# Patient Record
Sex: FEMALE | Race: WHITE | NOT HISPANIC OR LATINO | Employment: OTHER | URBAN - NONMETROPOLITAN AREA
[De-identification: names, ages, dates, MRNs, and addresses within clinical notes are randomized per-mention and may not be internally consistent; named-entity substitution may affect disease eponyms.]

---

## 2017-01-13 DIAGNOSIS — E11.9 DIABETES MELLITUS TYPE 2, CONTROLLED, WITHOUT COMPLICATIONS (HCC): ICD-10-CM

## 2017-01-13 DIAGNOSIS — I10 ESSENTIAL HYPERTENSION: ICD-10-CM

## 2017-01-13 RX ORDER — LISINOPRIL 10 MG/1
TABLET ORAL
Qty: 90 TABLET | Refills: 0 | Status: SHIPPED | OUTPATIENT
Start: 2017-01-13 | End: 2017-04-19 | Stop reason: SDUPTHER

## 2017-04-19 DIAGNOSIS — I10 ESSENTIAL HYPERTENSION: ICD-10-CM

## 2017-04-19 DIAGNOSIS — E11.9 CONTROLLED TYPE 2 DIABETES MELLITUS WITHOUT COMPLICATION, WITHOUT LONG-TERM CURRENT USE OF INSULIN (HCC): ICD-10-CM

## 2017-04-19 RX ORDER — LISINOPRIL 10 MG/1
10 TABLET ORAL DAILY
Qty: 90 TABLET | Refills: 1 | Status: SHIPPED | OUTPATIENT
Start: 2017-04-19 | End: 2017-08-14 | Stop reason: SDUPTHER

## 2017-04-19 NOTE — TELEPHONE ENCOUNTER
Elba called and needs refills on her Lisiinopril and Metformin. She has a physical coming up but will run out before that. Her appt was moved out due to Dr Sanchez being on vacation. I will refill these for her.

## 2017-05-12 ENCOUNTER — OFFICE VISIT (OUTPATIENT)
Dept: FAMILY MEDICINE CLINIC | Facility: CLINIC | Age: 62
End: 2017-05-12

## 2017-05-12 VITALS
HEIGHT: 66 IN | TEMPERATURE: 98.1 F | SYSTOLIC BLOOD PRESSURE: 110 MMHG | WEIGHT: 175 LBS | OXYGEN SATURATION: 98 % | HEART RATE: 88 BPM | DIASTOLIC BLOOD PRESSURE: 70 MMHG | BODY MASS INDEX: 28.12 KG/M2

## 2017-05-12 DIAGNOSIS — J40 BRONCHITIS: Primary | ICD-10-CM

## 2017-05-12 PROCEDURE — 99213 OFFICE O/P EST LOW 20 MIN: CPT | Performed by: FAMILY MEDICINE

## 2017-05-12 RX ORDER — PROMETHAZINE HYDROCHLORIDE AND CODEINE PHOSPHATE 6.25; 1 MG/5ML; MG/5ML
5 SYRUP ORAL EVERY 4 HOURS PRN
Qty: 180 ML | Refills: 1
Start: 2017-05-12 | End: 2017-08-23

## 2017-05-12 RX ORDER — AZITHROMYCIN 250 MG/1
TABLET, FILM COATED ORAL
Qty: 6 TABLET | Refills: 0 | Status: SHIPPED | OUTPATIENT
Start: 2017-05-12 | End: 2017-08-23

## 2017-05-24 ENCOUNTER — TELEPHONE (OUTPATIENT)
Dept: FAMILY MEDICINE CLINIC | Facility: CLINIC | Age: 62
End: 2017-05-24

## 2017-08-14 ENCOUNTER — TELEPHONE (OUTPATIENT)
Dept: FAMILY MEDICINE CLINIC | Facility: CLINIC | Age: 62
End: 2017-08-14

## 2017-08-14 DIAGNOSIS — E11.9 CONTROLLED TYPE 2 DIABETES MELLITUS WITHOUT COMPLICATION, WITHOUT LONG-TERM CURRENT USE OF INSULIN (HCC): ICD-10-CM

## 2017-08-14 DIAGNOSIS — E78.2 MIXED HYPERLIPIDEMIA: ICD-10-CM

## 2017-08-14 DIAGNOSIS — I10 ESSENTIAL HYPERTENSION: ICD-10-CM

## 2017-08-14 RX ORDER — ATORVASTATIN CALCIUM 20 MG/1
20 TABLET, FILM COATED ORAL DAILY
Qty: 90 TABLET | Refills: 0 | Status: SHIPPED | OUTPATIENT
Start: 2017-08-14 | End: 2021-02-05 | Stop reason: SDUPTHER

## 2017-08-14 RX ORDER — LORATADINE 10 MG/1
10 TABLET ORAL DAILY
Qty: 90 TABLET | Refills: 0 | Status: SHIPPED | OUTPATIENT
Start: 2017-08-14

## 2017-08-14 RX ORDER — RANITIDINE 150 MG/1
150 TABLET ORAL 2 TIMES DAILY
Qty: 180 TABLET | Refills: 0 | Status: SHIPPED | OUTPATIENT
Start: 2017-08-14 | End: 2021-02-05

## 2017-08-14 RX ORDER — LISINOPRIL 10 MG/1
10 TABLET ORAL DAILY
Qty: 90 TABLET | Refills: 0 | Status: SHIPPED | OUTPATIENT
Start: 2017-08-14 | End: 2017-09-22

## 2017-08-14 RX ORDER — ZOLPIDEM TARTRATE 10 MG/1
10 TABLET ORAL NIGHTLY PRN
Qty: 30 TABLET | Refills: 0 | OUTPATIENT
Start: 2017-08-14

## 2017-08-14 NOTE — TELEPHONE ENCOUNTER
Elba needs all her meds refilled. She will run out before her physical appt. I will send these in for her.

## 2017-08-23 ENCOUNTER — OFFICE VISIT (OUTPATIENT)
Dept: FAMILY MEDICINE CLINIC | Facility: CLINIC | Age: 62
End: 2017-08-23

## 2017-08-23 VITALS
WEIGHT: 175 LBS | HEART RATE: 87 BPM | DIASTOLIC BLOOD PRESSURE: 80 MMHG | SYSTOLIC BLOOD PRESSURE: 128 MMHG | OXYGEN SATURATION: 98 % | BODY MASS INDEX: 28.12 KG/M2 | HEIGHT: 66 IN

## 2017-08-23 DIAGNOSIS — N76.0 ACUTE VAGINITIS: ICD-10-CM

## 2017-08-23 DIAGNOSIS — R07.2 PRECORDIAL PAIN: Primary | ICD-10-CM

## 2017-08-23 DIAGNOSIS — I10 ESSENTIAL HYPERTENSION: ICD-10-CM

## 2017-08-23 DIAGNOSIS — E11.9 CONTROLLED TYPE 2 DIABETES MELLITUS WITHOUT COMPLICATION, UNSPECIFIED LONG TERM INSULIN USE STATUS: ICD-10-CM

## 2017-08-23 DIAGNOSIS — E78.2 MIXED HYPERLIPIDEMIA: ICD-10-CM

## 2017-08-23 PROCEDURE — 99214 OFFICE O/P EST MOD 30 MIN: CPT | Performed by: FAMILY MEDICINE

## 2017-08-23 PROCEDURE — 93000 ELECTROCARDIOGRAM COMPLETE: CPT | Performed by: FAMILY MEDICINE

## 2017-08-23 PROCEDURE — 36415 COLL VENOUS BLD VENIPUNCTURE: CPT | Performed by: FAMILY MEDICINE

## 2017-08-23 RX ORDER — FLUCONAZOLE 150 MG/1
TABLET ORAL
Qty: 2 TABLET | Refills: 1 | Status: SHIPPED | OUTPATIENT
Start: 2017-08-23 | End: 2017-08-25

## 2017-08-23 RX ORDER — NITROGLYCERIN 0.4 MG/1
0.4 TABLET SUBLINGUAL
Qty: 25 TABLET | Refills: 1 | Status: SHIPPED | OUTPATIENT
Start: 2017-08-23 | End: 2021-02-05 | Stop reason: SDUPTHER

## 2017-08-23 NOTE — PROGRESS NOTES
Subjective   Elba Park is a 61 y.o. female.     History of Present Illness   Elba is a 61-year-old female whose chief complaint today is chest pain.  She points to the left anterior aspect of the chest.  Her last physical was one year ago 1 mentions that she was having some then but did not want to pursue a workup.  Her  has recently been extensively treated for ischemic heart disease.  Today, she states that she's had significant pain over the last week or so.  Does not seem to radiate into her neck.  Not associated with diaphoresis but states she is very very weak.    Other problems include hypertension, hyperlipidemia, obstructive sleep apnea, and diabetes type 2.  Regarding diabetes, her last A1c a year ago was 8.5.  Basically she has been very medically noncompliant.  Does not come back for visits.  The following portions of the patient's history were reviewed and updated as appropriate: allergies, current medications, past family history, past medical history, past social history, past surgical history and problem list.    Review of Systems   Constitutional: Positive for activity change and fatigue. Negative for fever.   HENT: Negative for congestion and sore throat.    Respiratory: Negative for cough, shortness of breath and wheezing.    Cardiovascular: Positive for chest pain. Negative for palpitations and leg swelling.   Gastrointestinal: Negative for abdominal pain, blood in stool, diarrhea, nausea and vomiting.   Genitourinary: Negative for dysuria.   Skin: Negative for rash.       Objective   Physical Exam   Constitutional: She is oriented to person, place, and time. She appears well-nourished.   Obese, anxious.   HENT:   Right Ear: External ear normal.   Left Ear: External ear normal.   Nose: Nose normal.   Eyes: Conjunctivae and EOM are normal.   Neck: Normal range of motion. Neck supple. No thyromegaly present.   Cardiovascular: Normal rate, regular rhythm and normal heart sounds.     No murmur heard.  Pulmonary/Chest: Effort normal and breath sounds normal.   Abdominal: Soft. Bowel sounds are normal. She exhibits no mass. There is no tenderness.   Musculoskeletal: Normal range of motion. She exhibits no edema.   Lymphadenopathy:     She has no cervical adenopathy.     She has no axillary adenopathy.        Right: No inguinal and no supraclavicular adenopathy present.        Left: No inguinal and no supraclavicular adenopathy present.   Neurological: She is alert and oriented to person, place, and time. Coordination normal.   Skin: Skin is warm and dry. No rash noted. No pallor.   Psychiatric: She has a normal mood and affect. Her behavior is normal. Thought content normal.   Vitals reviewed.      ECG 12 Lead  Date/Time: 8/23/2017 11:35 AM  Performed by: BRENDAN MULLIGAN  Authorized by: BRENDAN MULLIGAN   Comparison: not compared with previous ECG   Rhythm: sinus rhythm  Rate: normal  ST Segments: ST segments normal  T Waves: T waves normal  QRS axis: left  Other: no other findings  Clinical impression: normal ECG          Assessment/Plan   Elba was seen today for chest pain and med refill.    Diagnoses and all orders for this visit:    Precordial pain  -     ECG 12 Lead  -     nitroglycerin (NITROSTAT) 0.4 MG SL tablet; Place 1 tablet under the tongue Every 5 (Five) Minutes As Needed for Chest Pain. Take no more than 3 doses in 15 minutes.    Essential hypertension  -     CBC & Differential  -     Comprehensive Metabolic Panel  -     Lipid Panel  -     TSH    Mixed hyperlipidemia    Controlled type 2 diabetes mellitus without complication, unspecified long term insulin use status  -     Hemoglobin A1c    Acute vaginitis  -     fluconazole (DIFLUCAN) 150 MG tablet; 1 po for vaginitis, may repeat in 4 days      Elba is a 61-year-old female with hypertension, hyperlipidemia, type 2 diabetes.  She has been very noncompliant with follow-ups regarding the diabetes.  Her  has just  gone through open heart surgery in May.  Today she is complaining of anterior chest pain.  She describes it as a heaviness.  Does not really radiate to the neck or arm and is not particularly associated with diaphoresis.  She is very frightened that it may be ischemic heart disease.  Her EKG is very normal and I have given her a copy to take to the cardiologist.  She will see Dr. Bernstein this Friday.  I do think she will probably need some sort of stress test but I will leave this to the discretion of the cardiologist.  Labs have been drawn including an A1c.  I also gave her a prescription for nitroglycerin.  For now, I want her to take an aspirin daily.  Tentative follow-up in 3 months regarding her diabetes.  I will speak to her when the labs are back.    Addendum.  Possible yeast vaginitis.  Also concerns me that her diabetes may be way out-of-control.  Diflucan given.  Did not do an exam today.

## 2017-08-24 LAB
ALBUMIN SERPL-MCNC: 4.1 G/DL (ref 3.6–4.8)
ALBUMIN/GLOB SERPL: 1.4 {RATIO} (ref 1.2–2.2)
ALP SERPL-CCNC: 126 IU/L (ref 39–117)
ALT SERPL-CCNC: 22 IU/L (ref 0–32)
AST SERPL-CCNC: 17 IU/L (ref 0–40)
BASOPHILS # BLD AUTO: 0 X10E3/UL (ref 0–0.2)
BASOPHILS NFR BLD AUTO: 0 %
BILIRUB SERPL-MCNC: 0.3 MG/DL (ref 0–1.2)
BUN SERPL-MCNC: 12 MG/DL (ref 8–27)
BUN/CREAT SERPL: 13 (ref 12–28)
CALCIUM SERPL-MCNC: 9.6 MG/DL (ref 8.7–10.3)
CHLORIDE SERPL-SCNC: 103 MMOL/L (ref 96–106)
CHOLEST SERPL-MCNC: 154 MG/DL (ref 100–199)
CO2 SERPL-SCNC: 32 MMOL/L (ref 18–29)
CREAT SERPL-MCNC: 0.96 MG/DL (ref 0.57–1)
EOSINOPHIL # BLD AUTO: 0.1 X10E3/UL (ref 0–0.4)
EOSINOPHIL NFR BLD AUTO: 1 %
ERYTHROCYTE [DISTWIDTH] IN BLOOD BY AUTOMATED COUNT: 14.2 % (ref 12.3–15.4)
GLOBULIN SER CALC-MCNC: 2.9 G/DL (ref 1.5–4.5)
GLUCOSE SERPL-MCNC: 178 MG/DL (ref 65–99)
HBA1C MFR BLD: 8 % (ref 4.8–5.6)
HCT VFR BLD AUTO: 42.9 % (ref 34–46.6)
HDLC SERPL-MCNC: 41 MG/DL
HGB BLD-MCNC: 14.9 G/DL (ref 11.1–15.9)
LDLC SERPL CALC-MCNC: 76 MG/DL (ref 0–99)
LYMPHOCYTES # BLD AUTO: 2.2 X10E3/UL (ref 0.7–3.1)
LYMPHOCYTES NFR BLD AUTO: 22 %
MCH RBC QN AUTO: 29.6 PG (ref 26.6–33)
MCHC RBC AUTO-ENTMCNC: 34.7 G/DL (ref 31.5–35.7)
MCV RBC AUTO: 85 FL (ref 79–97)
MONOCYTES # BLD AUTO: 0.8 X10E3/UL (ref 0.1–0.9)
MONOCYTES NFR BLD AUTO: 8 %
NEUTROPHILS # BLD AUTO: 6.9 X10E3/UL (ref 1.4–7)
NEUTROPHILS NFR BLD AUTO: 69 %
PLATELET # BLD AUTO: 316 X10E3/UL (ref 150–379)
POTASSIUM SERPL-SCNC: 4.8 MMOL/L (ref 3.5–5.2)
PROT SERPL-MCNC: 7 G/DL (ref 6–8.5)
RBC # BLD AUTO: 5.04 X10E6/UL (ref 3.77–5.28)
SODIUM SERPL-SCNC: 137 MMOL/L (ref 134–144)
TRIGL SERPL-MCNC: 184 MG/DL (ref 0–149)
TSH SERPL DL<=0.005 MIU/L-ACNC: 1.15 UIU/ML (ref 0.45–4.5)
VLDLC SERPL CALC-MCNC: 37 MG/DL (ref 5–40)
WBC # BLD AUTO: 10 X10E3/UL (ref 3.4–10.8)

## 2017-08-24 NOTE — PROGRESS NOTES
Call.  Labs are reviewed.  No anemia.  Renal and liver function are fine.  Thyroid function normal.  Overall, cholesterol profile also looks good and she should definitely continue the statin.  Hemoglobin A1c, 3 month average blood sugar test, is 8.0.  Overall this is not too bad.  Lower is better.  I am not going to change medicines yet.  With the cardiologist to see you first.  Of course diet/ exercise is the cornerstone of diabetes care.  In 3 months we will repeat A1c and will make other adjustments if necessary.

## 2017-08-25 ENCOUNTER — CONSULT (OUTPATIENT)
Dept: CARDIOLOGY | Facility: CLINIC | Age: 62
End: 2017-08-25

## 2017-08-25 ENCOUNTER — TELEPHONE (OUTPATIENT)
Dept: FAMILY MEDICINE CLINIC | Facility: CLINIC | Age: 62
End: 2017-08-25

## 2017-08-25 VITALS
HEART RATE: 77 BPM | BODY MASS INDEX: 27.37 KG/M2 | DIASTOLIC BLOOD PRESSURE: 86 MMHG | HEIGHT: 67 IN | WEIGHT: 174.4 LBS | SYSTOLIC BLOOD PRESSURE: 124 MMHG

## 2017-08-25 DIAGNOSIS — E78.5 HYPERLIPIDEMIA LDL GOAL <70: ICD-10-CM

## 2017-08-25 DIAGNOSIS — I10 ESSENTIAL HYPERTENSION: ICD-10-CM

## 2017-08-25 DIAGNOSIS — E11.9 CONTROLLED TYPE 2 DIABETES MELLITUS WITHOUT COMPLICATION, WITHOUT LONG-TERM CURRENT USE OF INSULIN (HCC): ICD-10-CM

## 2017-08-25 DIAGNOSIS — R06.09 DYSPNEA ON EXERTION: ICD-10-CM

## 2017-08-25 DIAGNOSIS — R55 VASOVAGAL EPISODE: ICD-10-CM

## 2017-08-25 DIAGNOSIS — I20.0 UNSTABLE ANGINA (HCC): Primary | ICD-10-CM

## 2017-08-25 PROCEDURE — 99244 OFF/OP CNSLTJ NEW/EST MOD 40: CPT | Performed by: INTERNAL MEDICINE

## 2017-08-25 RX ORDER — ISOSORBIDE MONONITRATE 30 MG/1
30 TABLET, EXTENDED RELEASE ORAL DAILY
Qty: 90 TABLET | Refills: 3 | Status: SHIPPED | OUTPATIENT
Start: 2017-08-25 | End: 2017-09-22

## 2017-08-25 NOTE — TELEPHONE ENCOUNTER
----- Message from Sacha Sanchez MD sent at 8/24/2017  7:43 AM EDT -----  Call.  Labs are reviewed.  No anemia.  Renal and liver function are fine.  Thyroid function normal.  Overall, cholesterol profile also looks good and she should definitely continue the statin.  Hemoglobin A1c, 3 month average blood sugar test, is 8.0.  Overall this is not too bad.  Lower is better.  I am not going to change medicines yet.  With the cardiologist to see you first.  Of course diet/ exercise is the cornerstone of diabetes care.  In 3 months we will repeat A1c and will make other adjustments if necessary.

## 2017-08-25 NOTE — ASSESSMENT & PLAN NOTE
· Progressive chest pain symptoms with both typical and atypical features  · Obtain nuclear stress test and echo  · Start isosorbide mononitrate 30 mg daily

## 2017-08-25 NOTE — PROGRESS NOTES
Encounter Date:08/25/2017    Patient ID: Elba Park is a  61 y.o. female who resides in [place], [place].    CC/Reason for visit:  Chest Pain (Consult ); Shortness of Breath; and Fatigue          Elba Park is referred to my office by Sacha Sanchez MD for evaluation of chest discomfort.  The patient has experienced chest discomfort over the last year.  These episodes are associated with pressure in the left precordium, generalized weakness, and radiation down her left arm and in her right neck.  The symptoms first started in March but she did not pursue evaluation at that time as her , who is a patient of mine, was undergoing bypass surgery.  She states that these symptoms have become more frequent.  Her symptoms of pressure can be constant and are relieved with nitroglycerin.  She states that she is had to curtail her physical activities and presently cannot walk through [place] without having to stop with symptoms.  She denies TIA or stroke symptoms.  She's not had a previous ischemic evaluation.  The patient also reports over the years having episodes of near syncope.  She will feel hot and flushed prior to losing postural tone.  She will not completely lose consciousness.  After several minutes, she will improve but still not feel well for some period time afterward.      Review of Systems   Constitution: Positive for weakness, malaise/fatigue and night sweats.   Eyes: Negative for vision loss in left eye and vision loss in right eye.   Cardiovascular: Positive for chest pain, dyspnea on exertion and palpitations. Negative for near-syncope, orthopnea, paroxysmal nocturnal dyspnea and syncope.   Respiratory: Positive for shortness of breath, sleep disturbances due to breathing and snoring.    Musculoskeletal: Positive for muscle weakness, neck pain and stiffness. Negative for myalgias.   Gastrointestinal: Positive for heartburn.   Neurological: Positive for difficulty with  "concentration and numbness. Negative for brief paralysis, excessive daytime sleepiness, focal weakness and paresthesias.   Psychiatric/Behavioral: The patient is nervous/anxious.    Allergic/Immunologic: Positive for environmental allergies.   All other systems reviewed and are negative.      The patient's past medical, social, family history and ROS reviewed in the patient's electronic medical record.    Allergies  Adhesive tape    Outpatient Prescriptions Marked as Taking for the 8/25/17 encounter (Consult) with Son Bernstein MD   Medication Sig Dispense Refill   • atorvastatin (LIPITOR) 20 MG tablet Take 1 tablet by mouth Daily. 90 tablet 0   • lisinopril (PRINIVIL,ZESTRIL) 10 MG tablet Take 1 tablet by mouth Daily. 90 tablet 0   • loratadine (CLARITIN) 10 MG tablet Take 1 tablet by mouth Daily. (Patient taking differently: Take 10 mg by mouth Daily As Needed.) 90 tablet 0   • metFORMIN (GLUCOPHAGE) 500 MG tablet Take 1 tablet by mouth 2 (Two) Times a Day With Meals. 180 tablet 0   • nitroglycerin (NITROSTAT) 0.4 MG SL tablet Place 1 tablet under the tongue Every 5 (Five) Minutes As Needed for Chest Pain. Take no more than 3 doses in 15 minutes. 25 tablet 1   • raNITIdine (ZANTAC) 150 MG tablet Take 1 tablet by mouth 2 (Two) Times a Day. (Patient taking differently: Take 150 mg by mouth 2 (Two) Times a Day As Needed.) 180 tablet 0   • zolpidem (AMBIEN) 10 MG tablet Take 1 tablet by mouth At Night As Needed for Sleep. 30 tablet 0   • [DISCONTINUED] fluconazole (DIFLUCAN) 150 MG tablet 1 po for vaginitis, may repeat in 4 days 2 tablet 1         Blood pressure 124/86, pulse 77, height 67\" (170.2 cm), weight 174 lb 6.4 oz (79.1 kg).  Body mass index is 27.31 kg/(m^2).    Physical Exam   Constitutional: She is oriented to person, place, and time. She appears well-developed and well-nourished.   HENT:   Head: Normocephalic and atraumatic.   Eyes: Pupils are equal, round, and reactive to light. No scleral " icterus.   Neck: No JVD present. No thyromegaly present.   Cardiovascular: Normal rate and regular rhythm.  Exam reveals no gallop.    No murmur heard.  Pulmonary/Chest: Effort normal and breath sounds normal.   Abdominal: Soft. She exhibits no distension. There is no hepatosplenomegaly.   Musculoskeletal: She exhibits no edema.   Neurological: She is alert and oriented to person, place, and time.   Skin: Skin is warm and dry.   Psychiatric: She has a normal mood and affect. Her behavior is normal.       Data Review:     Lab Results   Component Value Date    HGBA1C 8.0 (H) 08/23/2017     Lab Results   Component Value Date    TSH 1.150 08/23/2017     Lab Results   Component Value Date    TRIG 184 (H) 08/23/2017    HDL 41 08/23/2017    AST 17 08/23/2017    ALT 22 08/23/2017     Lab Results   Component Value Date    BUN 12 08/23/2017    CREATININE 0.96 08/23/2017    EGFRIFNONA 64 08/23/2017    EGFRIFAFRI 74 08/23/2017    BCR 13 08/23/2017    K 4.8 08/23/2017    CO2 32 (H) 08/23/2017    CALCIUM 9.6 08/23/2017    PROTENTOTREF 7.0 08/23/2017    ALBUMIN 4.1 08/23/2017    LABIL2 1.4 08/23/2017    AST 17 08/23/2017    ALT 22 08/23/2017     Lab Results   Component Value Date    WBC 10.0 08/23/2017    HGB 14.9 08/23/2017    HCT 42.9 08/23/2017    MCV 85 08/23/2017     08/23/2017     Procedures    EKG (8/23/17): Sinus rhythm with no ischemic ST or T-wave changes.       Problem List Items Addressed This Visit        Cardiovascular and Mediastinum    Unstable angina - Primary    Overview     · EKG with no ischemic changes, 8/23/17         Current Assessment & Plan     · Progressive chest pain symptoms with both typical and atypical features  · Obtain nuclear stress test and echo  · Start isosorbide mononitrate 30 mg daily         Relevant Medications    isosorbide mononitrate (IMDUR) 30 MG 24 hr tablet    Other Relevant Orders    Stress Test With Myocardial Perfusion One Day    Adult Transthoracic Echo Complete With  Contrast    Essential hypertension    Current Assessment & Plan     · Controlled  · Continue lisinopril         Hyperlipidemia LDL goal <70    Overview     · Statin therapy indicated given the presence of diabetes         Current Assessment & Plan     · Continue atorvastatin         Vasovagal episode    Overview     · History of recurrent near syncopal spells with prodrome         Current Assessment & Plan     · Obtain echocardiogram to ensure structurally normal heart            Respiratory    Dyspnea on exertion    Relevant Orders    Adult Transthoracic Echo Complete With Contrast       Endocrine    Diabetes mellitus type 2, controlled, without complications               · Echocardiogram  · Exercise nuclear stress test  · Start isosorbide mononitrate 30 mg daily  · Further recommendations to follow    Son Bernstein MD  8/25/2017     Scribed for Son Bernstein MD by Jamei Patton. 8/25/2017  6:15 PM

## 2017-08-28 ENCOUNTER — TELEPHONE (OUTPATIENT)
Dept: FAMILY MEDICINE CLINIC | Facility: CLINIC | Age: 62
End: 2017-08-28

## 2017-08-31 ENCOUNTER — TELEPHONE (OUTPATIENT)
Dept: FAMILY MEDICINE CLINIC | Facility: CLINIC | Age: 62
End: 2017-08-31

## 2017-09-01 ENCOUNTER — HOSPITAL ENCOUNTER (OUTPATIENT)
Dept: CARDIOLOGY | Facility: HOSPITAL | Age: 62
Discharge: HOME | End: 2017-09-01
Attending: INTERNAL MEDICINE | Admitting: INTERNAL MEDICINE

## 2017-09-01 ENCOUNTER — HOSPITAL ENCOUNTER (OUTPATIENT)
Dept: CARDIOLOGY | Facility: HOSPITAL | Age: 62
Discharge: HOME | End: 2017-09-01
Attending: INTERNAL MEDICINE

## 2017-09-01 DIAGNOSIS — I20.0 UNSTABLE ANGINA (HCC): ICD-10-CM

## 2017-09-01 DIAGNOSIS — R06.09 DYSPNEA ON EXERTION: ICD-10-CM

## 2017-09-01 LAB
BH CV ECHO MEAS - AO ROOT AREA (BSA CORRECTED): 1.7
BH CV ECHO MEAS - AO ROOT AREA: 8.6 CM^2
BH CV ECHO MEAS - AO ROOT DIAM: 3.3 CM
BH CV ECHO MEAS - BSA(HAYCOCK): 2 M^2
BH CV ECHO MEAS - BSA: 1.9 M^2
BH CV ECHO MEAS - BZI_BMI: 27.4 KILOGRAMS/M^2
BH CV ECHO MEAS - BZI_METRIC_HEIGHT: 170.2 CM
BH CV ECHO MEAS - BZI_METRIC_WEIGHT: 79.4 KG
BH CV ECHO MEAS - CONTRAST EF (2CH): 59.4 ML/M^2
BH CV ECHO MEAS - CONTRAST EF 4CH: 53.7 ML/M^2
BH CV ECHO MEAS - EDV(CUBED): 131.1 ML
BH CV ECHO MEAS - EDV(MOD-SP2): 64 ML
BH CV ECHO MEAS - EDV(MOD-SP4): 82 ML
BH CV ECHO MEAS - EDV(TEICH): 122.7 ML
BH CV ECHO MEAS - EF(CUBED): 64.4 %
BH CV ECHO MEAS - EF(MOD-SP2): 59.4 %
BH CV ECHO MEAS - EF(MOD-SP4): 53.7 %
BH CV ECHO MEAS - EF(TEICH): 55.6 %
BH CV ECHO MEAS - ESV(CUBED): 46.7 ML
BH CV ECHO MEAS - ESV(MOD-SP2): 26 ML
BH CV ECHO MEAS - ESV(MOD-SP4): 38 ML
BH CV ECHO MEAS - ESV(TEICH): 54.4 ML
BH CV ECHO MEAS - FS: 29.1 %
BH CV ECHO MEAS - IVS/LVPW: 1.1
BH CV ECHO MEAS - IVSD: 0.86 CM
BH CV ECHO MEAS - LA DIMENSION: 3.4 CM
BH CV ECHO MEAS - LA/AO: 1
BH CV ECHO MEAS - LV DIASTOLIC VOL/BSA (35-75): 42.9 ML/M^2
BH CV ECHO MEAS - LV MASS(C)D: 145.6 GRAMS
BH CV ECHO MEAS - LV MASS(C)DI: 76.2 GRAMS/M^2
BH CV ECHO MEAS - LV MAX PG: 3.4 MMHG
BH CV ECHO MEAS - LV MEAN PG: 2 MMHG
BH CV ECHO MEAS - LV SYSTOLIC VOL/BSA (12-30): 19.9 ML/M^2
BH CV ECHO MEAS - LV V1 MAX: 91.7 CM/SEC
BH CV ECHO MEAS - LV V1 MEAN: 58.1 CM/SEC
BH CV ECHO MEAS - LV V1 VTI: 21.3 CM
BH CV ECHO MEAS - LVIDD: 5.1 CM
BH CV ECHO MEAS - LVIDS: 3.6 CM
BH CV ECHO MEAS - LVLD AP2: 6.6 CM
BH CV ECHO MEAS - LVLD AP4: 7 CM
BH CV ECHO MEAS - LVLS AP2: 5.3 CM
BH CV ECHO MEAS - LVLS AP4: 5.8 CM
BH CV ECHO MEAS - LVOT AREA (M): 2.8 CM^2
BH CV ECHO MEAS - LVOT AREA: 2.8 CM^2
BH CV ECHO MEAS - LVOT DIAM: 1.9 CM
BH CV ECHO MEAS - LVPWD: 0.8 CM
BH CV ECHO MEAS - MV A MAX VEL: 86.4 CM/SEC
BH CV ECHO MEAS - MV E MAX VEL: 65.6 CM/SEC
BH CV ECHO MEAS - MV E/A: 0.76
BH CV ECHO MEAS - PA ACC SLOPE: 711 CM/SEC^2
BH CV ECHO MEAS - PA ACC TIME: 0.12 SEC
BH CV ECHO MEAS - PA PR(ACCEL): 24.6 MMHG
BH CV ECHO MEAS - PI END-D VEL: 106 CM/SEC
BH CV ECHO MEAS - RAP SYSTOLE: 8 MMHG
BH CV ECHO MEAS - RVDD: 3.3 CM
BH CV ECHO MEAS - RVSP: 26.1 MMHG
BH CV ECHO MEAS - SI(CUBED): 44.2 ML/M^2
BH CV ECHO MEAS - SI(LVOT): 31.6 ML/M^2
BH CV ECHO MEAS - SI(MOD-SP2): 19.9 ML/M^2
BH CV ECHO MEAS - SI(MOD-SP4): 23 ML/M^2
BH CV ECHO MEAS - SI(TEICH): 35.7 ML/M^2
BH CV ECHO MEAS - SV(CUBED): 84.4 ML
BH CV ECHO MEAS - SV(LVOT): 60.4 ML
BH CV ECHO MEAS - SV(MOD-SP2): 38 ML
BH CV ECHO MEAS - SV(MOD-SP4): 44 ML
BH CV ECHO MEAS - SV(TEICH): 68.3 ML
BH CV ECHO MEAS - TAPSE (>1.6): 1.7 CM2
BH CV ECHO MEAS - TR MAX VEL: 213 CM/SEC
BH CV NUCLEAR PRIOR STUDY: 2
BH CV STRESS BP STAGE 1: NORMAL
BH CV STRESS BP STAGE 2: NORMAL
BH CV STRESS DURATION MIN STAGE 1: 3
BH CV STRESS DURATION MIN STAGE 2: 2
BH CV STRESS DURATION SEC STAGE 1: 0
BH CV STRESS DURATION SEC STAGE 2: 0
BH CV STRESS GRADE STAGE 1: 10
BH CV STRESS GRADE STAGE 2: 12
BH CV STRESS HR STAGE 1: 130
BH CV STRESS HR STAGE 2: 147
BH CV STRESS METS STAGE 1: 5
BH CV STRESS METS STAGE 2: 7.5
BH CV STRESS O2 STAGE 2: 98
BH CV STRESS PROTOCOL 1: NORMAL
BH CV STRESS RECOVERY BP: NORMAL MMHG
BH CV STRESS RECOVERY HR: 84 BPM
BH CV STRESS RECOVERY O2: 98 %
BH CV STRESS SPEED STAGE 1: 1.7
BH CV STRESS SPEED STAGE 2: 2.5
BH CV STRESS STAGE 1: 1
BH CV STRESS STAGE 2: 2
BH CV VAS BP LEFT ARM: NORMAL MMHG
BH CV XLRA - RV BASE: 3.7 CM
BH CV XLRA - RV LENGTH: 5.4 CM
BH CV XLRA - RV MID: 2.9 CM
LEFT ATRIUM VOLUME INDEX: 24 ML/M2
LV EF 2D ECHO EST: 60 %
LV EF NUC BP: 49 %
MAXIMAL PREDICTED HEART RATE: 159 BPM
PERCENT MAX PREDICTED HR: 92.45 %
STRESS BASELINE BP: NORMAL MMHG
STRESS BASELINE HR: 82 BPM
STRESS O2 SAT REST: 97 %
STRESS PERCENT HR: 109 %
STRESS POST ESTIMATED WORKLOAD: 6.4 METS
STRESS POST EXERCISE DUR MIN: 5 MIN
STRESS POST EXERCISE DUR SEC: 0 SEC
STRESS POST O2 SAT PEAK: 98 %
STRESS POST PEAK BP: NORMAL MMHG
STRESS POST PEAK HR: 147 BPM
STRESS TARGET HR: 135 BPM

## 2017-09-01 PROCEDURE — 93306 TTE W/DOPPLER COMPLETE: CPT

## 2017-09-01 PROCEDURE — 78452 HT MUSCLE IMAGE SPECT MULT: CPT

## 2017-09-01 PROCEDURE — 93306 TTE W/DOPPLER COMPLETE: CPT | Performed by: INTERNAL MEDICINE

## 2017-09-01 PROCEDURE — 93018 CV STRESS TEST I&R ONLY: CPT | Performed by: INTERNAL MEDICINE

## 2017-09-01 PROCEDURE — 0 TECHNETIUM SESTAMIBI: Performed by: INTERNAL MEDICINE

## 2017-09-01 PROCEDURE — 78452 HT MUSCLE IMAGE SPECT MULT: CPT | Performed by: INTERNAL MEDICINE

## 2017-09-01 PROCEDURE — 93017 CV STRESS TEST TRACING ONLY: CPT

## 2017-09-01 PROCEDURE — A9500 TC99M SESTAMIBI: HCPCS | Performed by: INTERNAL MEDICINE

## 2017-09-01 RX ADMIN — TECHNETIUM TC-99M SESTAMIBI 1 DOSE: 1 INJECTION INTRAVENOUS at 08:10

## 2017-09-01 RX ADMIN — TECHNETIUM TC-99M SESTAMIBI 1 DOSE: 1 INJECTION INTRAVENOUS at 10:25

## 2017-09-22 ENCOUNTER — OFFICE VISIT (OUTPATIENT)
Dept: CARDIOLOGY | Facility: CLINIC | Age: 62
End: 2017-09-22

## 2017-09-22 VITALS
DIASTOLIC BLOOD PRESSURE: 78 MMHG | HEIGHT: 67 IN | HEART RATE: 85 BPM | WEIGHT: 174.6 LBS | SYSTOLIC BLOOD PRESSURE: 116 MMHG | BODY MASS INDEX: 27.4 KG/M2

## 2017-09-22 DIAGNOSIS — I10 ESSENTIAL HYPERTENSION: Primary | ICD-10-CM

## 2017-09-22 DIAGNOSIS — E11.9 CONTROLLED TYPE 2 DIABETES MELLITUS WITHOUT COMPLICATION, WITHOUT LONG-TERM CURRENT USE OF INSULIN (HCC): ICD-10-CM

## 2017-09-22 DIAGNOSIS — R06.09 DYSPNEA ON EXERTION: ICD-10-CM

## 2017-09-22 DIAGNOSIS — E78.5 HYPERLIPIDEMIA LDL GOAL <70: ICD-10-CM

## 2017-09-22 DIAGNOSIS — R55 VASOVAGAL EPISODE: ICD-10-CM

## 2017-09-22 DIAGNOSIS — R07.9 CHEST PAIN, UNSPECIFIED TYPE: ICD-10-CM

## 2017-09-22 PROCEDURE — 99214 OFFICE O/P EST MOD 30 MIN: CPT | Performed by: NURSE PRACTITIONER

## 2017-09-22 RX ORDER — ASPIRIN 81 MG/1
81 TABLET ORAL DAILY
Qty: 90 TABLET | Refills: 3
Start: 2017-09-22 | End: 2018-09-22

## 2017-09-22 NOTE — ASSESSMENT & PLAN NOTE
· Recommend patient follow-up with her PCP for GI consult for EGD.  · Start aspirin 81 mg daily  · Okay to discontinue isosorbide  · Sublingual nitroglycerin for any episodes of chest pain  · Follow-up in 2 months or sooner if needed.  If at follow-up patient still experiencing symptoms will consider proceeding with a cardiac catheterization.

## 2017-09-22 NOTE — ASSESSMENT & PLAN NOTE
· Discontinue lisinopril for 1 week and follow-up with PCP.  If symptoms do not improve restart lisinopril regular dosage.  If symptoms improve restart lisinopril at lower dosage of 2.5 mg daily for renal protection in the presence of diabetes mellitus.

## 2017-09-22 NOTE — PROGRESS NOTES
Encounter Date:09/22/2017    Patient ID: Elba Park is a 61 y.o. female who resides in [place], [place] and whose  is also a patient of Dr. Bernstein.    CC/Reason for visit:  Hypertension         Problem List Items Addressed This Visit        Cardiovascular and Mediastinum    Hyperlipidemia LDL goal <70    Overview     · Statin therapy indicated given the presence of diabetes         Current Assessment & Plan     · Continue Lipitor 20 mg daily  · Follow-up PCP for routine lipid monitoring         Essential hypertension - Primary    Current Assessment & Plan     · Stop lisinopril to see if symptoms imrpove  · Follow-up with PCP for blood pressure check.  If symptoms of pre syncope improve would recommend restarting lisinopril at lower dose of 2.5 mg for renal protection given her history of uncontrolled diabetes mellitus.         Vasovagal episode    Overview     · History of recurrent near syncopal spells with prodrome         Current Assessment & Plan     · Discontinue lisinopril for 1 week and follow-up with PCP.  If symptoms do not improve restart lisinopril regular dosage.  If symptoms improve restart lisinopril at lower dosage of 2.5 mg daily for renal protection in the presence of diabetes mellitus.            Respiratory    Dyspnea on exertion       Endocrine    Diabetes mellitus type 2, controlled, without complications    Overview     · Most recent hemoglobin A1c level on 08/25/2017= 8.0         Current Assessment & Plan     · Follow-up with PCP for improved glucose management.  Will likely need upper titration of metformin            Nervous and Auditory    Chest pain    Overview     · EKG with no ischemic changes, 8/23/17  · MPS (09/01/2017): Normal myocardial perfusion study with no evidence of ischemia.  LVEF borderline normal at 49%.  Moderately reduced exercise capacity.  Impression consistent with low risk study.  · Echo (09/01/2017): LVEF = 60%.  Grade 1 diastolic dysfunction.   Cardiac valves and is, plan functionally normal.             Current Assessment & Plan     · Recommend patient follow-up with her PCP for GI consult for EGD.  · Start aspirin 81 mg daily  · Okay to discontinue isosorbide  · Sublingual nitroglycerin for any episodes of chest pain  · Follow-up in 2 months or sooner if needed.  If at follow-up patient still experiencing symptoms will consider proceeding with a cardiac catheterization.         Relevant Medications    aspirin 81 MG EC tablet        Mrs. Park has been experiencing near syncopal episodes for many years.  Her symptoms could be related to low blood pressure therefore we will discontinue her lisinopril to see if this improves her symptoms.  She has an appointment next week with her primary care provider which time they should recheck her blood pressure and assess for her symptoms.  If her symptoms have improved I would recommend restarting a lower dose lisinopril at 2.5 mg for renal protection in the presence of diabetes mellitus.  If her symptoms have not improved the lisinopril to be restarted at her normal dosage.  I instructed her to stay well hydrated as she could be experiencing some vasovagal syncope due to dehydration in presence of elevated glucose levels.  We will continue all of her current medications at this time.  I do want to bring her back in 2 months for reassessment of her symptoms.  If she is still experiencing shortness of breath and chest pain will consider proceeding with a cardiac catheterization.       · Start aspirin 81 mg daily  · Temporarily stop lisinopril to see if there is improvement with her near syncopal episodes.  She should follow-up with her PCP at already scheduled appointment next week.  If her symptoms have not improved I would resume her lisinopril at regular dosage.  If symptoms have improved can restart lisinopril at 2.5 mg daily for renal protection and presence of diabetes mellitus.  · Follow-up with PCP may  benefit from referral for a EGD.  We will also need tighter glucose control as her extremely elevated glucose levels could be contributing to her increased fatigue and flushing sensation.  · Continue to use sublingual nitroglycerin as needed for any episodes of chest pain.  · Follow-up 2 months or sooner if needed.  Patient is still symptomatic can consider cardiac catheterization.        Elba Park returns for follow-up for her chest pain, shortness of breath and fatigue.  Since her last visit she has underwent a echocardiogram that showed a normal LVEF and structurally and anatomically normal valves.  She did have grade 1 diastolic dysfunction.  Her myocardial perfusion study was negative for ischemia but the patient did have a moderately reduced exercise capacity.  The impression was consistent with low risk study.  The patient still experiences a chest burning sensation that occurs more in the upper epigastric area.  She has not had an EGD and many years and is currently taking Zantac.  She has been experiencing near syncopal episodes for quite a few years and will also feel a hot flash sensation prior to losing postural tone.  At her last visit she was started on isosorbide but was unable to take it due to severe headaches.  She has a history of type 2 diabetes that is not well controlled as her hemoglobin A1c was 8.0.  She has complaints of increased fatigue and feeling weak when she experiences near syncope.  She admits since her visit one month ago she is actually feeling somewhat better.  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    Review of Systems   Constitution: Positive for weakness, malaise/fatigue and night sweats.   HENT:        Headaches     Cardiovascular: Positive for chest pain, dyspnea on exertion, irregular heartbeat, palpitations and syncope.   Respiratory: Positive for shortness of breath, sleep disturbances due  "to breathing and snoring.    Endocrine: Positive for polyuria.   Hematologic/Lymphatic: Bruises/bleeds easily.   Skin: Positive for flushing.   Gastrointestinal: Positive for constipation and heartburn.   Genitourinary: Positive for bladder incontinence and frequency.       The patient's past medical, social, family history and ROS reviewed in the patient's electronic medical record.    Allergies  Adhesive tape    Outpatient Prescriptions Marked as Taking for the 9/22/17 encounter (Office Visit) with TREVOR Webster   Medication Sig Dispense Refill   • atorvastatin (LIPITOR) 20 MG tablet Take 1 tablet by mouth Daily. 90 tablet 0   • loratadine (CLARITIN) 10 MG tablet Take 1 tablet by mouth Daily. (Patient taking differently: Take 10 mg by mouth As Needed.) 90 tablet 0   • metFORMIN (GLUCOPHAGE) 500 MG tablet Take 1 tablet by mouth 2 (Two) Times a Day With Meals. 180 tablet 0   • nitroglycerin (NITROSTAT) 0.4 MG SL tablet Place 1 tablet under the tongue Every 5 (Five) Minutes As Needed for Chest Pain. Take no more than 3 doses in 15 minutes. 25 tablet 1   • raNITIdine (ZANTAC) 150 MG tablet Take 1 tablet by mouth 2 (Two) Times a Day. (Patient taking differently: Take 150 mg by mouth 2 (Two) Times a Day As Needed.) 180 tablet 0   • zolpidem (AMBIEN) 10 MG tablet Take 1 tablet by mouth At Night As Needed for Sleep. 30 tablet 0   • [DISCONTINUED] lisinopril (PRINIVIL,ZESTRIL) 10 MG tablet Take 1 tablet by mouth Daily. 90 tablet 0         Blood pressure 116/78, pulse 85, height 67\" (170.2 cm), weight 174 lb 9.6 oz (79.2 kg).  Body mass index is 27.35 kg/(m^2).    Physical Exam   Constitutional: She is oriented to person, place, and time. She appears well-developed and well-nourished.   HENT:   Head: Normocephalic and atraumatic.   Eyes: Pupils are equal, round, and reactive to light. No scleral icterus.   Neck: No JVD present. Carotid bruit is not present. No thyromegaly present.   Cardiovascular: Normal rate, " regular rhythm, S1 normal and S2 normal.  Exam reveals no gallop.    No murmur heard.  Pulmonary/Chest: Effort normal and breath sounds normal.   Abdominal: Soft. There is no hepatosplenomegaly. There is no tenderness.   Neurological: She is alert and oriented to person, place, and time.   Skin: Skin is warm and dry. No cyanosis. Nails show no clubbing.   Psychiatric: She has a normal mood and affect. Her behavior is normal.       Data Review:   TREVOR Arzate  9/22/2017

## 2017-09-22 NOTE — ASSESSMENT & PLAN NOTE
· Stop lisinopril to see if symptoms imrpove  · Follow-up with PCP for blood pressure check.  If symptoms of pre syncope improve would recommend restarting lisinopril at lower dose of 2.5 mg for renal protection given her history of uncontrolled diabetes mellitus.

## 2017-09-22 NOTE — ASSESSMENT & PLAN NOTE
· Follow-up with PCP for improved glucose management.  Will likely need upper titration of metformin

## 2017-12-01 ENCOUNTER — OFFICE VISIT (OUTPATIENT)
Dept: CARDIOLOGY | Facility: CLINIC | Age: 62
End: 2017-12-01

## 2017-12-01 VITALS
WEIGHT: 173.2 LBS | DIASTOLIC BLOOD PRESSURE: 78 MMHG | HEART RATE: 76 BPM | BODY MASS INDEX: 27.18 KG/M2 | HEIGHT: 67 IN | SYSTOLIC BLOOD PRESSURE: 119 MMHG

## 2017-12-01 DIAGNOSIS — E78.5 HYPERLIPIDEMIA LDL GOAL <70: ICD-10-CM

## 2017-12-01 DIAGNOSIS — I20.0 UNSTABLE ANGINA PECTORIS (HCC): Primary | ICD-10-CM

## 2017-12-01 DIAGNOSIS — R55 VASOVAGAL EPISODE: Primary | ICD-10-CM

## 2017-12-01 DIAGNOSIS — I10 ESSENTIAL HYPERTENSION: ICD-10-CM

## 2017-12-01 DIAGNOSIS — R06.09 DYSPNEA ON EXERTION: ICD-10-CM

## 2017-12-01 DIAGNOSIS — R55 VASOVAGAL EPISODE: ICD-10-CM

## 2017-12-01 DIAGNOSIS — I20.0 UNSTABLE ANGINA (HCC): ICD-10-CM

## 2017-12-01 PROBLEM — R07.89 OTHER CHEST PAIN: Status: ACTIVE | Noted: 2017-08-25

## 2017-12-01 PROCEDURE — 99215 OFFICE O/P EST HI 40 MIN: CPT | Performed by: NURSE PRACTITIONER

## 2017-12-01 RX ORDER — OMEPRAZOLE 40 MG/1
40 CAPSULE, DELAYED RELEASE ORAL DAILY
COMMUNITY
End: 2021-02-05

## 2017-12-01 RX ORDER — LISINOPRIL 10 MG/1
10 TABLET ORAL DAILY
Qty: 30 TABLET | Refills: 11 | Status: SHIPPED | OUTPATIENT
Start: 2017-12-01 | End: 2021-02-05 | Stop reason: SDUPTHER

## 2017-12-01 NOTE — PROGRESS NOTES
"Encounter Date:12/01/2017    Patient ID: Elba Park is a 62 y.o. female who resides in [place], [place] and his  is also a patient of ours    CC/Reason for visit:  Chest Pain (2 month follow up); Hypertension; and Shortness of Breath            Elba Park returns today for follow-up of her chest pain symptoms near syncopal spells and cardiac risk factors.  At her last visit the patient's results of her stress test and echocardiogram were reviewed and both were within normal limits showing no evidence of ischemia and normal valvular structures.  The patient is still experiencing limiting symptoms.  She has been a very active person all of her life and [place] last [place].  Over the past 12 months she has developed chest discomfort that radiates into her left arm and increased shortness of breath that causes her to have to take frequent rest periods while on her walk.  After about 15 minutes her symptoms will subside and she is able to resume her activity.  She has also been experiencing palpitations and fluttering in her chest and causes her to feel weak like she will pass out.  Sometimes the symptoms occur together while other times they are 2 separate incidences.  She had an episode the other day after walking to her basement to get her small Amarjit tree and after walking back up the stairs she felt weak all over and felt as if she would pass out and also felt her heart racing.  She has checked her glucose levels when she has these \"spells\" and it is always within normal limits.  She is even checked her blood pressure during these times and it is been within normal limits as well.  After her episode the other day she had to go to her bedroom and lie down for approximately 15 or 20 minutes after which she felt fine.  Since her last visit she underwent an EGD that did show some mild erosive gastritis and a nodular lesion in her duodenal bulb.  " "Biopsies were taken and have come back within normal limits.  A Schatzki's ring was also present and dilation was performed.    Review of Systems   Constitution: Positive for weakness and malaise/fatigue.   Cardiovascular: Positive for chest pain and dyspnea on exertion.   Respiratory: Positive for shortness of breath.    Musculoskeletal: Positive for arthritis and muscle weakness.   Gastrointestinal: Positive for heartburn.   Genitourinary: Positive for bladder incontinence.       The patient's past medical, social, family history and ROS reviewed in the patient's electronic medical record.    Allergies  Adhesive tape    Outpatient Prescriptions Marked as Taking for the 12/1/17 encounter (Office Visit) with TREVOR Webster   Medication Sig Dispense Refill   • aspirin 81 MG EC tablet Take 1 tablet by mouth Daily. 90 tablet 3   • atorvastatin (LIPITOR) 20 MG tablet Take 1 tablet by mouth Daily. 90 tablet 0   • loratadine (CLARITIN) 10 MG tablet Take 1 tablet by mouth Daily. (Patient taking differently: Take 10 mg by mouth As Needed.) 90 tablet 0   • metFORMIN (GLUCOPHAGE) 500 MG tablet Take 1 tablet by mouth 2 (Two) Times a Day With Meals. (Patient taking differently: Take 1,000 mg by mouth 2 (Two) Times a Day With Meals.) 180 tablet 0   • nitroglycerin (NITROSTAT) 0.4 MG SL tablet Place 1 tablet under the tongue Every 5 (Five) Minutes As Needed for Chest Pain. Take no more than 3 doses in 15 minutes. 25 tablet 1   • omeprazole (priLOSEC) 40 MG capsule Take 40 mg by mouth Daily.     • raNITIdine (ZANTAC) 150 MG tablet Take 1 tablet by mouth 2 (Two) Times a Day. (Patient taking differently: Take 150 mg by mouth 2 (Two) Times a Day As Needed.) 180 tablet 0   • zolpidem (AMBIEN) 10 MG tablet Take 1 tablet by mouth At Night As Needed for Sleep. 30 tablet 0         Blood pressure 119/78, pulse 76, height 67\" (170.2 cm), weight 173 lb 3.2 oz (78.6 kg).  Body mass index is 27.13 kg/(m^2).    Physical Exam "   Constitutional: She is oriented to person, place, and time. She appears well-developed and well-nourished.   HENT:   Head: Normocephalic and atraumatic.   Eyes: Pupils are equal, round, and reactive to light. No scleral icterus.   Neck: No JVD present. Carotid bruit is not present. No thyromegaly present.   Cardiovascular: Normal rate, regular rhythm, S1 normal and S2 normal.  Exam reveals no gallop.    No murmur heard.  Pulmonary/Chest: Effort normal and breath sounds normal.   Abdominal: Soft. There is no hepatosplenomegaly. There is no tenderness.   Neurological: She is alert and oriented to person, place, and time.   Skin: Skin is warm and dry. No cyanosis. Nails show no clubbing.   Psychiatric: She has a normal mood and affect. Her behavior is normal.       Data Review:   Procedures       Problem List Items Addressed This Visit        Cardiovascular and Mediastinum    Hyperlipidemia LDL goal <70    Overview     · Statin therapy indicated given the presence of diabetes         Current Assessment & Plan     · Continue Lipitor 20 mg daily         Essential hypertension    Current Assessment & Plan     · Hypertension is controlled  · Continue lisinopril 10 mg daily         Relevant Medications    lisinopril (PRINIVIL,ZESTRIL) 10 MG tablet    Unstable angina - Primary    Overview     · EKG with no ischemic changes, 8/23/17  · MPS (09/01/2017): Normal myocardial perfusion study with no evidence of ischemia.  LVEF borderline normal at 49%.  Moderately reduced exercise capacity.  Impression consistent with low risk study.  · Echo (09/01/2017): LVEF = 60%.  Grade 1 diastolic dysfunction.  Cardiac valves and is, plan functionally normal.         Current Assessment & Plan     · Continue aspirin 81 mg daily  · Schedule for cardiac cath         Vasovagal episode    Overview     · History of recurrent near syncopal spells with prodrome         Current Assessment & Plan     · Two-week event monitor            Respiratory     "Dyspnea on exertion    Current Assessment & Plan     · NYHA class 2-3 symptoms             Despite having a normal stress test and echocardiogram Mrs. Park has continued to have progressive dyspnea and exertional chest pain symptoms.  She is also having \"spells\" of near syncope.  We will order a 2 week event monitor and schedule her to undergo a cardiac catheterization for unstable angina.  The patient is currently not on any antianginals other than using sublingual nitroglycerin which does relieve her chest pain.  We discussed starting a low-dose calcium channel blocker or beta blocker that she does not wish to start any medications at this time.  Further recommendations to follow.       · Two-week event monitor  · Schedule cardiac catheterization via left radial approach  · Further recommendations to follow    Mi Matos, TREVOR  12/1/2017    "

## 2017-12-04 ENCOUNTER — PREP FOR SURGERY (OUTPATIENT)
Dept: OTHER | Facility: HOSPITAL | Age: 62
End: 2017-12-04

## 2017-12-04 DIAGNOSIS — I20.0 UNSTABLE ANGINA (HCC): Primary | ICD-10-CM

## 2017-12-04 DIAGNOSIS — Z00.00 HEALTHCARE MAINTENANCE: ICD-10-CM

## 2017-12-04 RX ORDER — ASPIRIN 81 MG/1
81 TABLET ORAL DAILY
Status: CANCELLED | OUTPATIENT
Start: 2017-12-05

## 2017-12-04 RX ORDER — NITROGLYCERIN 0.4 MG/1
0.4 TABLET SUBLINGUAL
Status: CANCELLED | OUTPATIENT
Start: 2017-12-04

## 2017-12-04 RX ORDER — ASPIRIN 81 MG/1
324 TABLET, CHEWABLE ORAL ONCE
Status: CANCELLED | OUTPATIENT
Start: 2017-12-04 | End: 2017-12-04

## 2017-12-04 RX ORDER — SODIUM CHLORIDE 0.9 % (FLUSH) 0.9 %
1-10 SYRINGE (ML) INJECTION AS NEEDED
Status: CANCELLED | OUTPATIENT
Start: 2017-12-04

## 2017-12-04 RX ORDER — ACETAMINOPHEN 325 MG/1
650 TABLET ORAL EVERY 4 HOURS PRN
Status: CANCELLED | OUTPATIENT
Start: 2017-12-04

## 2017-12-05 ENCOUNTER — APPOINTMENT (OUTPATIENT)
Dept: PREADMISSION TESTING | Facility: HOSPITAL | Age: 62
End: 2017-12-05

## 2017-12-05 ENCOUNTER — HOSPITAL ENCOUNTER (OUTPATIENT)
Dept: GENERAL RADIOLOGY | Facility: HOSPITAL | Age: 62
Discharge: HOME | End: 2017-12-05
Admitting: NURSE PRACTITIONER

## 2017-12-05 VITALS — HEIGHT: 67 IN

## 2017-12-05 DIAGNOSIS — Z00.00 HEALTHCARE MAINTENANCE: ICD-10-CM

## 2017-12-05 DIAGNOSIS — I20.0 UNSTABLE ANGINA (HCC): ICD-10-CM

## 2017-12-05 LAB
ALBUMIN SERPL-MCNC: 4.1 G/DL (ref 3.2–4.8)
ALBUMIN/GLOB SERPL: 1.6 G/DL (ref 1.5–2.5)
ALP SERPL-CCNC: 107 U/L (ref 25–100)
ALT SERPL W P-5'-P-CCNC: 15 U/L (ref 7–40)
ANION GAP SERPL CALCULATED.3IONS-SCNC: 8 MMOL/L (ref 3–11)
ARTICHOKE IGE QN: 86 MG/DL (ref 0–130)
AST SERPL-CCNC: 13 U/L (ref 0–33)
BASOPHILS # BLD AUTO: 0.04 10*3/MM3 (ref 0–0.2)
BASOPHILS NFR BLD AUTO: 0.4 % (ref 0–1)
BILIRUB SERPL-MCNC: 0.3 MG/DL (ref 0.3–1.2)
BUN BLD-MCNC: 11 MG/DL (ref 9–23)
BUN/CREAT SERPL: 12.2 (ref 7–25)
CALCIUM SPEC-SCNC: 9.3 MG/DL (ref 8.7–10.4)
CHLORIDE SERPL-SCNC: 103 MMOL/L (ref 99–109)
CHOLEST SERPL-MCNC: 135 MG/DL (ref 0–200)
CO2 SERPL-SCNC: 28 MMOL/L (ref 20–31)
CREAT BLD-MCNC: 0.9 MG/DL (ref 0.6–1.3)
DEPRECATED RDW RBC AUTO: 46.8 FL (ref 37–54)
EOSINOPHIL # BLD AUTO: 0.12 10*3/MM3 (ref 0–0.3)
EOSINOPHIL NFR BLD AUTO: 1.2 % (ref 0–3)
ERYTHROCYTE [DISTWIDTH] IN BLOOD BY AUTOMATED COUNT: 14.5 % (ref 11.3–14.5)
GFR SERPL CREATININE-BSD FRML MDRD: 63 ML/MIN/1.73
GLOBULIN UR ELPH-MCNC: 2.5 GM/DL
GLUCOSE BLD-MCNC: 181 MG/DL (ref 70–100)
HBA1C MFR BLD: 7.8 % (ref 4.8–5.6)
HCT VFR BLD AUTO: 42.7 % (ref 34.5–44)
HDLC SERPL-MCNC: 37 MG/DL (ref 40–60)
HGB BLD-MCNC: 13.8 G/DL (ref 11.5–15.5)
IMM GRANULOCYTES # BLD: 0.06 10*3/MM3 (ref 0–0.03)
IMM GRANULOCYTES NFR BLD: 0.6 % (ref 0–0.6)
LYMPHOCYTES # BLD AUTO: 2.58 10*3/MM3 (ref 0.6–4.8)
LYMPHOCYTES NFR BLD AUTO: 26.4 % (ref 24–44)
MCH RBC QN AUTO: 28.5 PG (ref 27–31)
MCHC RBC AUTO-ENTMCNC: 32.3 G/DL (ref 32–36)
MCV RBC AUTO: 88.2 FL (ref 80–99)
MONOCYTES # BLD AUTO: 0.91 10*3/MM3 (ref 0–1)
MONOCYTES NFR BLD AUTO: 9.3 % (ref 0–12)
NEUTROPHILS # BLD AUTO: 6.07 10*3/MM3 (ref 1.5–8.3)
NEUTROPHILS NFR BLD AUTO: 62.1 % (ref 41–71)
PLATELET # BLD AUTO: 328 10*3/MM3 (ref 150–450)
PMV BLD AUTO: 9.7 FL (ref 6–12)
POTASSIUM BLD-SCNC: 4.2 MMOL/L (ref 3.5–5.5)
PROT SERPL-MCNC: 6.6 G/DL (ref 5.7–8.2)
RBC # BLD AUTO: 4.84 10*6/MM3 (ref 3.89–5.14)
SODIUM BLD-SCNC: 139 MMOL/L (ref 132–146)
TRIGL SERPL-MCNC: 211 MG/DL (ref 0–150)
WBC NRBC COR # BLD: 9.78 10*3/MM3 (ref 3.5–10.8)

## 2017-12-05 PROCEDURE — 93010 ELECTROCARDIOGRAM REPORT: CPT | Performed by: INTERNAL MEDICINE

## 2017-12-05 PROCEDURE — 36415 COLL VENOUS BLD VENIPUNCTURE: CPT

## 2017-12-05 PROCEDURE — 80053 COMPREHEN METABOLIC PANEL: CPT | Performed by: NURSE PRACTITIONER

## 2017-12-05 PROCEDURE — 71020 HC CHEST PA AND LATERAL: CPT

## 2017-12-05 PROCEDURE — 83036 HEMOGLOBIN GLYCOSYLATED A1C: CPT | Performed by: NURSE PRACTITIONER

## 2017-12-05 PROCEDURE — 85025 COMPLETE CBC W/AUTO DIFF WBC: CPT | Performed by: NURSE PRACTITIONER

## 2017-12-05 PROCEDURE — 80061 LIPID PANEL: CPT | Performed by: NURSE PRACTITIONER

## 2017-12-05 PROCEDURE — 93005 ELECTROCARDIOGRAM TRACING: CPT

## 2017-12-05 NOTE — DISCHARGE INSTRUCTIONS
"Dear Patient,    Do NOT eat or drink anything after midnight except for sips of water with your AM prescription medications unless otherwise instructed by your physician.    Do NOT smoke after midnight the night before your procedure.    Glasses and jewelry may be worn, but dentures must be removed prior to your procedure.    Leave any items you consider valuable at home.      MORNING of your Procedure, please bring the following:     -Photo ID and insurance card(s)    -ALL medications in their ORIGINAL CONTAINERS    -Co-pay and/or deductible required by your insurance   -Copy of living will or power of  document (if not brought to    Pre-Admission Testing department)   -CPAP mask and tubing, not your machine (if applicable)   -Skin Prep Instruction Sheet (if applicable)    Family members may wait in [facility] waiting area during procedure.    Need to make arrangements for transportation prior to discharge.    A handout regarding \"Heart Healthy Eating\" was provided today to encourage healthy eating habits.    Booklet published by [employer] was given in Pre-Admission testing.  This booklet is for informational purposes only.  If you have any questions about your procedure, please speak with your physician.    "

## 2017-12-06 ENCOUNTER — HOSPITAL ENCOUNTER (OUTPATIENT)
Facility: HOSPITAL | Age: 62
Discharge: HOME | End: 2017-12-06
Attending: INTERNAL MEDICINE | Admitting: INTERNAL MEDICINE

## 2017-12-06 VITALS
DIASTOLIC BLOOD PRESSURE: 81 MMHG | TEMPERATURE: 97.8 F | WEIGHT: 171.74 LBS | HEIGHT: 67 IN | SYSTOLIC BLOOD PRESSURE: 128 MMHG | BODY MASS INDEX: 26.96 KG/M2 | HEART RATE: 91 BPM | OXYGEN SATURATION: 98 % | RESPIRATION RATE: 16 BRPM

## 2017-12-06 DIAGNOSIS — I20.0 UNSTABLE ANGINA PECTORIS (HCC): ICD-10-CM

## 2017-12-06 DIAGNOSIS — I20.0 UNSTABLE ANGINA (HCC): ICD-10-CM

## 2017-12-06 LAB
GLUCOSE BLDC GLUCOMTR-MCNC: 105 MG/DL (ref 70–130)
GLUCOSE BLDC GLUCOMTR-MCNC: 124 MG/DL (ref 70–130)

## 2017-12-06 PROCEDURE — 25010000002 FENTANYL CITRATE (PF) 100 MCG/2ML SOLUTION: Performed by: INTERNAL MEDICINE

## 2017-12-06 PROCEDURE — 25010000002 MIDAZOLAM PER 1 MG: Performed by: INTERNAL MEDICINE

## 2017-12-06 PROCEDURE — 93458 L HRT ARTERY/VENTRICLE ANGIO: CPT | Performed by: INTERNAL MEDICINE

## 2017-12-06 PROCEDURE — 25010000002 HEPARIN (PORCINE) PER 1000 UNITS: Performed by: INTERNAL MEDICINE

## 2017-12-06 PROCEDURE — C1769 GUIDE WIRE: HCPCS | Performed by: INTERNAL MEDICINE

## 2017-12-06 PROCEDURE — C1894 INTRO/SHEATH, NON-LASER: HCPCS | Performed by: INTERNAL MEDICINE

## 2017-12-06 PROCEDURE — 0 IOPAMIDOL PER 1 ML: Performed by: INTERNAL MEDICINE

## 2017-12-06 PROCEDURE — 82962 GLUCOSE BLOOD TEST: CPT

## 2017-12-06 RX ORDER — MIDAZOLAM HYDROCHLORIDE 1 MG/ML
INJECTION INTRAMUSCULAR; INTRAVENOUS AS NEEDED
Status: DISCONTINUED | OUTPATIENT
Start: 2017-12-06 | End: 2017-12-06 | Stop reason: HOSPADM

## 2017-12-06 RX ORDER — LIDOCAINE HYDROCHLORIDE 10 MG/ML
INJECTION, SOLUTION EPIDURAL; INFILTRATION; INTRACAUDAL; PERINEURAL AS NEEDED
Status: DISCONTINUED | OUTPATIENT
Start: 2017-12-06 | End: 2017-12-06 | Stop reason: HOSPADM

## 2017-12-06 RX ORDER — SODIUM CHLORIDE 0.9 % (FLUSH) 0.9 %
1-10 SYRINGE (ML) INJECTION AS NEEDED
Status: DISCONTINUED | OUTPATIENT
Start: 2017-12-06 | End: 2017-12-06 | Stop reason: HOSPADM

## 2017-12-06 RX ORDER — NITROGLYCERIN 0.4 MG/1
0.4 TABLET SUBLINGUAL
Status: DISCONTINUED | OUTPATIENT
Start: 2017-12-06 | End: 2017-12-06 | Stop reason: HOSPADM

## 2017-12-06 RX ORDER — ASPIRIN 81 MG/1
81 TABLET ORAL DAILY
Status: DISCONTINUED | OUTPATIENT
Start: 2017-12-07 | End: 2017-12-06 | Stop reason: HOSPADM

## 2017-12-06 RX ORDER — ASPIRIN 81 MG/1
324 TABLET, CHEWABLE ORAL ONCE
Status: COMPLETED | OUTPATIENT
Start: 2017-12-06 | End: 2017-12-06

## 2017-12-06 RX ORDER — HEPARIN SODIUM 1000 [USP'U]/ML
INJECTION, SOLUTION INTRAVENOUS; SUBCUTANEOUS AS NEEDED
Status: DISCONTINUED | OUTPATIENT
Start: 2017-12-06 | End: 2017-12-06 | Stop reason: HOSPADM

## 2017-12-06 RX ORDER — FENTANYL CITRATE 50 UG/ML
INJECTION, SOLUTION INTRAMUSCULAR; INTRAVENOUS AS NEEDED
Status: DISCONTINUED | OUTPATIENT
Start: 2017-12-06 | End: 2017-12-06 | Stop reason: HOSPADM

## 2017-12-06 RX ORDER — SODIUM CHLORIDE 9 MG/ML
1-3 INJECTION, SOLUTION INTRAVENOUS CONTINUOUS
Status: DISCONTINUED | OUTPATIENT
Start: 2017-12-06 | End: 2017-12-06 | Stop reason: HOSPADM

## 2017-12-06 RX ORDER — ACETAMINOPHEN 325 MG/1
650 TABLET ORAL EVERY 4 HOURS PRN
Status: DISCONTINUED | OUTPATIENT
Start: 2017-12-06 | End: 2017-12-06 | Stop reason: HOSPADM

## 2017-12-06 RX ADMIN — SODIUM CHLORIDE 3 ML/KG/HR: 9 INJECTION, SOLUTION INTRAVENOUS at 09:48

## 2017-12-06 RX ADMIN — ASPIRIN 81 MG 324 MG: 81 TABLET ORAL at 09:47

## 2017-12-06 NOTE — INTERVAL H&P NOTE
H&P reviewed. The patient was examined and there are no changes to the H&P.     No changes to the physical exam  Lab Results   Component Value Date    BUN 11 12/05/2017     Lab Results   Component Value Date    CREATININE 0.90 12/05/2017     Lab Results   Component Value Date     12/05/2017    K 4.2 12/05/2017     12/05/2017    CO2 28.0 12/05/2017     Lab Results   Component Value Date    WBC 9.78 12/05/2017    HGB 13.8 12/05/2017    HCT 42.7 12/05/2017    MCV 88.2 12/05/2017     12/05/2017     Hospital Problem List     * (Principal)Unstable angina    Overview Addendum 12/5/2017  6:37 PM by Son Bernstein IV, MD     · EKG with no ischemic changes, 8/23/17  · Nuclear stress test (09/01/2017): Normal myocardial perfusion study with no evidence of ischemia.  LVEF borderline normal at 49%.  Moderately reduced exercise capacity.  Impression consistent with low risk study.  · Echo (09/01/2017): LVEF = 60%.  Grade 1 diastolic dysfunction.  Cardiac valves function normally.         Diabetes mellitus type 2, controlled, without complications    Overview Signed 9/22/2017 12:08 PM by TREVOR Webster     · Most recent hemoglobin A1c level on 08/25/2017= 8.0         Dyspnea on exertion    Hyperlipidemia LDL goal <70    Overview Signed 8/25/2017  6:13 PM by Son Bernstein IV, MD     · Statin therapy indicated given the presence of diabetes         Essential hypertension          Plan:    · Cardiac cath via the left radial approach  · Further recommendations to follow      Charmaine MURRAY scribed for Dr Duncan Bernstein

## 2017-12-06 NOTE — H&P (VIEW-ONLY)
"Encounter Date:12/01/2017    Patient ID: Elba Park is a 62 y.o. female who resides in [place], [place] and his  is also a patient of ours    CC/Reason for visit:  Chest Pain (2 month follow up); Hypertension; and Shortness of Breath            Elba Park returns today for follow-up of her chest pain symptoms near syncopal spells and cardiac risk factors.  At her last visit the patient's results of her stress test and echocardiogram were reviewed and both were within normal limits showing no evidence of ischemia and normal valvular structures.  The patient is still experiencing limiting symptoms.  She has been a very active person all of her life and has a walking [place] last [place] years.  Over the past 12 months she has developed chest discomfort that radiates into her left arm and increased shortness of breath that causes her to have to take frequent rest periods while on her walk.  After about 15 minutes her symptoms will subside and she is able to resume her activity.  She has also been experiencing palpitations and fluttering in her chest and causes her to feel weak like she will pass out.  Sometimes the symptoms occur together while other times they are 2 separate incidences.  She had an episode the other day after walking to her basement to get her small Amarjit tree and after walking back up the stairs she felt weak all over and felt as if she would pass out and also felt her heart racing.  She has checked her glucose levels when she has these \"spells\" and it is always within normal limits.  She is even checked her blood pressure during these times and it is been within normal limits as well.  After her episode the other day she had to go to her bedroom and lie down for approximately 15 or 20 minutes after which she felt fine.  Since her last visit she underwent an EGD that did show some mild erosive gastritis and a nodular lesion in her duodenal bulb.  " "Biopsies were taken and have come back within normal limits.  A Schatzki's ring was also present and dilation was performed.    Review of Systems   Constitution: Positive for weakness and malaise/fatigue.   Cardiovascular: Positive for chest pain and dyspnea on exertion.   Respiratory: Positive for shortness of breath.    Musculoskeletal: Positive for arthritis and muscle weakness.   Gastrointestinal: Positive for heartburn.   Genitourinary: Positive for bladder incontinence.       The patient's past medical, social, family history and ROS reviewed in the patient's electronic medical record.    Allergies  Adhesive tape    Outpatient Prescriptions Marked as Taking for the 12/1/17 encounter (Office Visit) with TREVOR Webster   Medication Sig Dispense Refill   • aspirin 81 MG EC tablet Take 1 tablet by mouth Daily. 90 tablet 3   • atorvastatin (LIPITOR) 20 MG tablet Take 1 tablet by mouth Daily. 90 tablet 0   • loratadine (CLARITIN) 10 MG tablet Take 1 tablet by mouth Daily. (Patient taking differently: Take 10 mg by mouth As Needed.) 90 tablet 0   • metFORMIN (GLUCOPHAGE) 500 MG tablet Take 1 tablet by mouth 2 (Two) Times a Day With Meals. (Patient taking differently: Take 1,000 mg by mouth 2 (Two) Times a Day With Meals.) 180 tablet 0   • nitroglycerin (NITROSTAT) 0.4 MG SL tablet Place 1 tablet under the tongue Every 5 (Five) Minutes As Needed for Chest Pain. Take no more than 3 doses in 15 minutes. 25 tablet 1   • omeprazole (priLOSEC) 40 MG capsule Take 40 mg by mouth Daily.     • raNITIdine (ZANTAC) 150 MG tablet Take 1 tablet by mouth 2 (Two) Times a Day. (Patient taking differently: Take 150 mg by mouth 2 (Two) Times a Day As Needed.) 180 tablet 0   • zolpidem (AMBIEN) 10 MG tablet Take 1 tablet by mouth At Night As Needed for Sleep. 30 tablet 0         Blood pressure 119/78, pulse 76, height 67\" (170.2 cm), weight 173 lb 3.2 oz (78.6 kg).  Body mass index is 27.13 kg/(m^2).    Physical Exam "   Constitutional: She is oriented to person, place, and time. She appears well-developed and well-nourished.   HENT:   Head: Normocephalic and atraumatic.   Eyes: Pupils are equal, round, and reactive to light. No scleral icterus.   Neck: No JVD present. Carotid bruit is not present. No thyromegaly present.   Cardiovascular: Normal rate, regular rhythm, S1 normal and S2 normal.  Exam reveals no gallop.    No murmur heard.  Pulmonary/Chest: Effort normal and breath sounds normal.   Abdominal: Soft. There is no hepatosplenomegaly. There is no tenderness.   Neurological: She is alert and oriented to person, place, and time.   Skin: Skin is warm and dry. No cyanosis. Nails show no clubbing.   Psychiatric: She has a normal mood and affect. Her behavior is normal.       Data Review:   Procedures       Problem List Items Addressed This Visit        Cardiovascular and Mediastinum    Hyperlipidemia LDL goal <70    Overview     · Statin therapy indicated given the presence of diabetes         Current Assessment & Plan     · Continue Lipitor 20 mg daily         Essential hypertension    Current Assessment & Plan     · Hypertension is controlled  · Continue lisinopril 10 mg daily         Relevant Medications    lisinopril (PRINIVIL,ZESTRIL) 10 MG tablet    Unstable angina - Primary    Overview     · EKG with no ischemic changes, 8/23/17  · MPS (09/01/2017): Normal myocardial perfusion study with no evidence of ischemia.  LVEF borderline normal at 49%.  Moderately reduced exercise capacity.  Impression consistent with low risk study.  · Echo (09/01/2017): LVEF = 60%.  Grade 1 diastolic dysfunction.  Cardiac valves and is, plan functionally normal.         Current Assessment & Plan     · Continue aspirin 81 mg daily  · Schedule for cardiac cath         Vasovagal episode    Overview     · History of recurrent near syncopal spells with prodrome         Current Assessment & Plan     · Two-week event monitor            Respiratory     "Dyspnea on exertion    Current Assessment & Plan     · NYHA class 2-3 symptoms             Despite having a normal stress test and echocardiogram Mrs. Park has continued to have progressive dyspnea and exertional chest pain symptoms.  She is also having \"spells\" of near syncope.  We will order a 2 week event monitor and schedule her to undergo a cardiac catheterization for unstable angina.  The patient is currently not on any antianginals other than using sublingual nitroglycerin which does relieve her chest pain.  We discussed starting a low-dose calcium channel blocker or beta blocker that she does not wish to start any medications at this time.  Further recommendations to follow.       · Two-week event monitor  · Schedule cardiac catheterization via left radial approach  · Further recommendations to follow    Mi Matos, TREVOR  12/1/2017    "

## 2018-04-25 ENCOUNTER — TRANSCRIBE ORDERS (OUTPATIENT)
Dept: PHYSICAL THERAPY | Facility: CLINIC | Age: 63
End: 2018-04-25

## 2018-04-25 ENCOUNTER — OFFICE VISIT (OUTPATIENT)
Dept: PHYSICAL THERAPY | Facility: CLINIC | Age: 63
End: 2018-04-25

## 2018-04-25 DIAGNOSIS — Z47.89 ORTHOPEDIC AFTERCARE: ICD-10-CM

## 2018-04-25 DIAGNOSIS — S52.501A CLOSED FRACTURE OF DISTAL END OF RIGHT RADIUS, UNSPECIFIED FRACTURE MORPHOLOGY, INITIAL ENCOUNTER: ICD-10-CM

## 2018-04-25 DIAGNOSIS — S52.501A CLOSED FRACTURE OF DISTAL END OF RIGHT RADIUS, UNSPECIFIED FRACTURE MORPHOLOGY, INITIAL ENCOUNTER: Primary | ICD-10-CM

## 2018-04-25 PROCEDURE — L3808 WHFO, RIGID W/O JOINTS: HCPCS | Performed by: PHYSICAL THERAPIST

## 2018-04-25 NOTE — PROGRESS NOTES
Elba Park 1955   Diagnosis/ Surgery: radial fracture              Date Of Injury: 4/2/18    Date Of Surgery:4/12/18    Hand Dominance: Right  History of Present Condition: Pt went for a walk on April 2nd and fell. Her hand caught on a tree root and it fractured.  Medical/Vocational History/ Medications: No previous injuries to this hand. Pt is on percocet, metformin, cholesterol medication and lisopril.     Pain: 7/10    Edema: moderate edema present  Sensibility: WNL   Wound Status:Wound closed and healing. No signs of infection.   ROM/ Strength/Test: N/T due to fracture precautions    Splinting:  · Patient was measure and fit with a custom fabricated volar wrist immobilization splint.   · Patient was instructed in wearing schedule, precautions and care of the splint during this visit.   · Patient was instructed in proper donning/doffing of splint.   Assessment:  · Patient was fitted and appropriate splint was fabricated this date.  · Patient reported that splint was comfortable and had no complications with the fit of the splint.  · Patient was instructed and patient verbalized understanding of precautions, wear and care of the splint.   · Patient demonstrated independent donning/doffing of splint during treatment today.  Goals:  · Patient was fitted properly with appropriate splint for diagnosis  · Patient was educated on precautions, wear schedule and care of splint  · Patient demonstrated independence with donning/doffing of the splint.  · Splint was provided to Protect Healing Structures, Restrict Mobility, Improve joint alignment.  Plan:  · No additional treatment is required for this patient at this time. The patient is therefore discharged from therapy.  · Patient advised to contact therapist with any additional questions or concerns regarding the fit and function of the splint.  · Patient will be seen for splint issues as needed   · Wear Instructions: Off for hygiene     Yesika Neri, PT  Student    PT SIGNATURE: Paolo Bess, PT   DATE TREATMENT INITIATED: 4/25/2018    Physician Signature____________________________________ Date____________

## 2021-02-05 ENCOUNTER — CONSULT (OUTPATIENT)
Dept: CARDIOLOGY | Facility: CLINIC | Age: 66
End: 2021-02-05

## 2021-02-05 VITALS
WEIGHT: 165 LBS | HEIGHT: 67 IN | OXYGEN SATURATION: 97 % | DIASTOLIC BLOOD PRESSURE: 74 MMHG | HEART RATE: 82 BPM | SYSTOLIC BLOOD PRESSURE: 138 MMHG | BODY MASS INDEX: 25.9 KG/M2 | TEMPERATURE: 98 F

## 2021-02-05 DIAGNOSIS — R07.9 CHEST PAIN WITH NORMAL CORONARY ANGIOGRAPHY: Primary | ICD-10-CM

## 2021-02-05 DIAGNOSIS — E11.9 TYPE 2 DIABETES MELLITUS WITHOUT COMPLICATION, WITHOUT LONG-TERM CURRENT USE OF INSULIN (HCC): ICD-10-CM

## 2021-02-05 DIAGNOSIS — R07.9 CHEST PAIN WITH NORMAL CORONARY ANGIOGRAPHY: ICD-10-CM

## 2021-02-05 DIAGNOSIS — E78.5 HYPERLIPIDEMIA LDL GOAL <70: Chronic | ICD-10-CM

## 2021-02-05 DIAGNOSIS — I10 ESSENTIAL HYPERTENSION: Chronic | ICD-10-CM

## 2021-02-05 DIAGNOSIS — I10 ESSENTIAL HYPERTENSION: ICD-10-CM

## 2021-02-05 PROBLEM — R55 VASOVAGAL EPISODE: Status: RESOLVED | Noted: 2017-08-25 | Resolved: 2021-02-05

## 2021-02-05 PROBLEM — I20.0 UNSTABLE ANGINA PECTORIS (HCC): Status: RESOLVED | Noted: 2017-12-06 | Resolved: 2021-02-05

## 2021-02-05 PROBLEM — R06.09 DYSPNEA ON EXERTION: Status: RESOLVED | Noted: 2017-08-25 | Resolved: 2021-02-05

## 2021-02-05 PROCEDURE — 93000 ELECTROCARDIOGRAM COMPLETE: CPT | Performed by: INTERNAL MEDICINE

## 2021-02-05 PROCEDURE — 99204 OFFICE O/P NEW MOD 45 MIN: CPT | Performed by: INTERNAL MEDICINE

## 2021-02-05 RX ORDER — ISOSORBIDE MONONITRATE 30 MG/1
30 TABLET, EXTENDED RELEASE ORAL EVERY MORNING
Qty: 90 TABLET | Refills: 3 | Status: SHIPPED | OUTPATIENT
Start: 2021-02-05

## 2021-02-05 RX ORDER — NITROGLYCERIN 0.4 MG/1
0.4 TABLET SUBLINGUAL
Qty: 25 TABLET | Refills: 5 | Status: SHIPPED | OUTPATIENT
Start: 2021-02-05

## 2021-02-05 RX ORDER — PANTOPRAZOLE SODIUM 40 MG/1
40 TABLET, DELAYED RELEASE ORAL DAILY
COMMUNITY

## 2021-02-05 RX ORDER — LISINOPRIL 10 MG/1
10 TABLET ORAL DAILY
Qty: 90 TABLET | Refills: 3 | Status: SHIPPED | OUTPATIENT
Start: 2021-02-05

## 2021-02-05 RX ORDER — ATORVASTATIN CALCIUM 20 MG/1
20 TABLET, FILM COATED ORAL DAILY
Qty: 90 TABLET | Refills: 3
Start: 2021-02-05 | End: 2021-02-05 | Stop reason: SDUPTHER

## 2021-02-05 RX ORDER — ATORVASTATIN CALCIUM 20 MG/1
20 TABLET, FILM COATED ORAL DAILY
Qty: 90 TABLET | Refills: 3 | Status: SHIPPED | OUTPATIENT
Start: 2021-02-05 | End: 2022-02-09 | Stop reason: SDUPTHER

## 2021-02-05 RX ORDER — ISOSORBIDE MONONITRATE 30 MG/1
30 TABLET, EXTENDED RELEASE ORAL EVERY MORNING
Qty: 90 TABLET | Refills: 3 | Status: SHIPPED | OUTPATIENT
Start: 2021-02-05 | End: 2021-02-05 | Stop reason: SDUPTHER

## 2021-02-05 NOTE — PROGRESS NOTES
[facility] at [facility]  Cardiology Consultation Note     DATE: 02/05/2021  Requesting Provider: Delicia Driver MD  PCP: Delicia Driver MD    IDENTIFICATION: Elba Park is a 65 y.o. female who resides in [place], [place]. Her , Romulo, is a patient of mine.    REASON FOR CONSULTATION: Chest pain          Dear Dr. Driver,    Thank you for referring Elba Park to my office for evaluation of chest pain.  The patient is a 65-year-old female who I have seen previously for chest pain symptoms.  The patient underwent stress testing in 2017 which showed no evidence of ischemia.  Persistent symptoms, she subsequently underwent cardiac catheterization in December 2017 which showed normal coronary arteries.  She was treated empirically for acid reflux with Protonix    The patient states that she has been experiencing chest pressure which occurs all the time.  It does get worse when she walks.  She believes that the chest discomfort gets better when she takes a sublingual nitroglycerin, although she has not done that recently.  She states her blood pressure is generally well controlled and recently underwent lab work which showed good control of her diabetes.    The patient is concerned by her shortness of breath [place] being related to a cardiac condition.      Past Medical History, Past Surgical History, Family history, Social History, and Medications were all reviewed with the patient today and updated as necessary.       Current Outpatient Medications:   •  atorvastatin (LIPITOR) 20 MG tablet, Take 1 tablet by mouth Daily., Disp: 90 tablet, Rfl: 3  •  lisinopril (PRINIVIL,ZESTRIL) 10 MG tablet, Take 1 tablet by mouth Daily., Disp: 30 tablet, Rfl: 11  •  loratadine (CLARITIN) 10 MG tablet, Take 1 tablet by mouth Daily. (Patient taking differently: Take 10 mg by mouth As Needed for Allergies.), Disp: 90 tablet, Rfl: 0  •  metFORMIN (GLUCOPHAGE)  1000 MG tablet, Take 1 tablet by mouth 2 (Two) Times a Day With Meals., Disp: , Rfl:   •  nitroglycerin (Nitrostat) 0.4 MG SL tablet, Place 1 tablet under the tongue Every 5 (Five) Minutes As Needed for Chest Pain. Take no more than 3 doses in 15 minutes., Disp: 25 tablet, Rfl: 5  •  pantoprazole (PROTONIX) 40 MG EC tablet, Take 40 mg by mouth Daily., Disp: , Rfl:   •  zolpidem (AMBIEN) 10 MG tablet, Take 1 tablet by mouth At Night As Needed for Sleep., Disp: 30 tablet, Rfl: 0  •  isosorbide mononitrate (IMDUR) 30 MG 24 hr tablet, Take 1 tablet by mouth Every Morning., Disp: 90 tablet, Rfl: 3    Allergies   Allergen Reactions   • Adhesive Tape Rash     Liquid adhesive          Past Medical History:   Diagnosis Date   • Arthritis     knees, left ankle    • Cancer (CMS/HCC)     skin -left leg    • Chronic insomnia    • Chronic venous insufficiency    • Constipation     meds prn    • Dental bridge present    • Diabetes mellitus type 2, controlled, without complications (CMS/HCC)     checks sugars once or twice weekly-usually run in 140s   • Disease of thyroid gland     thryoid lesions-monitored annually    • Esophageal reflux    • Essential hypertension    • GERD (gastroesophageal reflux disease)    • Lower esophageal ring (Schatzki) 10/2017    recent dilation    • Mixed hyperlipidemia    • Obstructive sleep apnea    • Skin lesion    • Sleep apnea with use of continuous positive airway pressure (CPAP)    • SARAH (stress urinary incontinence, female)        Past Surgical History:   Procedure Laterality Date   • CARDIAC CATHETERIZATION  2007    normal ([facility])   • CARDIAC CATHETERIZATION N/A 12/6/2017    Procedure: Left Heart Cath;  Surgeon: Son Bernstein IV, MD;  Location: [facility] INVASIVE LOCATION;  Service:    • COLONOSCOPY  2017   • ENDOMETRIAL BIOPSY  2008    cindi   • ENDOSCOPY      recent scope included dilation    • INCISIONAL HERNIA REPAIR  2010   • LASIK     • NASAL SEPTUM SURGERY     • SKIN  "CANCER DESTRUCTION      left leg    • UMBILICAL HERNIA REPAIR      total of 6 overall hernia surgeries    • VENTRAL HERNIA REPAIR         Family History   Problem Relation Age of Onset   • Diabetes Mother    • Heart disease Mother    • Coronary artery disease Father    • Heart disease Father    • Breast cancer Sister        Social History     Tobacco Use   • Smoking status: Never Smoker   • Smokeless tobacco: Never Used   Substance Use Topics   • Alcohol use: No       Review of Systems   Constitution: Negative for malaise/fatigue.   Eyes: Negative for vision loss in left eye and vision loss in right eye.   Cardiovascular: Positive for chest pain, dyspnea on exertion and palpitations. Negative for near-syncope, orthopnea, paroxysmal nocturnal dyspnea and syncope.   Musculoskeletal: Negative for myalgias.   Gastrointestinal: Positive for heartburn.   Neurological: Negative for brief paralysis, excessive daytime sleepiness, focal weakness, numbness, paresthesias and weakness.   All other systems reviewed and are negative.              /74 (BP Location: Right arm, Patient Position: Sitting)   Pulse 82   Temp 98 °F (36.7 °C)   Ht 170.2 cm (67\")   Wt 74.8 kg (165 lb)   SpO2 97%   BMI 25.84 kg/m²        Constitutional:       Appearance: Healthy appearance. Well-developed.   Eyes:      General: Lids are normal. No scleral icterus.     Conjunctiva/sclera: Conjunctivae normal.   HENT:      Head: Normocephalic and atraumatic.   Neck:      Musculoskeletal: Normal range of motion.      Thyroid: No thyromegaly.      Vascular: No carotid bruit or JVD.   Pulmonary:      Effort: Pulmonary effort is normal.      Breath sounds: Normal breath sounds. No wheezing. No rhonchi. No rales.   Cardiovascular:      Normal rate. Regular rhythm.      Murmurs: There is no murmur.      No gallop. No rub.   Pulses:     Intact distal pulses.   Abdominal:      General: There is no distension.      Palpations: Abdomen is soft. There is no " abdominal mass.   Skin:     General: Skin is warm and dry.      Findings: No rash.   Neurological:      General: No focal deficit present.      Mental Status: Alert and oriented to person, place, and time.      Gait: Gait is intact.   Psychiatric:         Attention and Perception: Attention normal.         Mood and Affect: Mood normal.         Behavior: Behavior normal.             ECG 12 Lead    Date/Time: 2/5/2021 11:11 AM  Performed by: Son Bernstein IV, MD  Authorized by: Son Bernstein IV, MD   Comparison: compared with previous ECG from 12/5/2017  Similar to previous ECG  Rhythm: sinus rhythm  BPM: 77    Clinical impression: normal ECG            Labs (1/2021) reviewed with patient:  · Hemoglobin A1c 6.3  · White count 8.5, hemoglobin 14.2, platelets 331  · Total cholesterol 162, triglycerides 9 9, HDL 42,   · Glucose 107, BUN 11, creatinine 0.78, potassium 4.6, sodium 142           Problem List Items Addressed This Visit        Other    Chest pain with normal coronary angiography - Primary (Chronic)    Overview     · EKG with no ischemic changes, 8/23/17  · Nuclear stress test (09/01/2017): Normal myocardial perfusion study with no evidence of ischemia.  LVEF borderline normal at 49%.  Moderately reduced exercise capacity.  Impression consistent with low risk study.  · Echo (09/01/2017): LVEF  60%. Cardiac valves function normally.  · Cardiac catheterization for refractory symptoms (12/6/17): Normal coronary arteries.  Normal LVEF.         Current Assessment & Plan     · Persistent nitrate responsive chest pain symptoms with history of normal coronary arteries in 2017 raises my suspicion for microvascular angina  · Recommend exercise echo given risk factors  · Start isosorbide mononitrate 30 mg daily  · Consider invasive microvascular testing once available         Relevant Medications    isosorbide mononitrate (IMDUR) 30 MG 24 hr tablet    nitroglycerin (Nitrostat) 0.4 MG SL  tablet    Other Relevant Orders    Adult Stress Echo W/ Cont or Stress Agent if Necessary Per Protocol    Hyperlipidemia LDL goal <70 (Chronic)    Overview     · Statin therapy indicated given the presence of diabetes         Current Assessment & Plan     · Reasonably well-controlled  · Consider increasing atorvastatin to 40 mg         Relevant Medications    atorvastatin (LIPITOR) 20 MG tablet    Essential hypertension (Chronic)    Overview     • Target blood pressure <130/80 mmHg         Current Assessment & Plan     · Mildly elevated today's visit  · Continue present medical therapy         Type 2 diabetes mellitus (CMS/HCC) (Chronic)    Current Assessment & Plan     · Well-controlled  · Consider SGL 2 inhibitor therapy for diabetes and CV risk reduction         Relevant Medications    metFORMIN (GLUCOPHAGE) 1000 MG tablet                   · Exercise stress echo with complete 2D echo  · Consider invasive microvascular testing once available  · Start isosorbide mononitrate 30 mg daily  · Consider increasing atorvastatin to 40 mg  Return in about 1 year (around 2/5/2022) for Follow-up with [facility] only.          CYDNEY Bernstein MD [facility], [facility]  Interventional and General Cardiology    02/05/21  11:16 EST

## 2021-02-05 NOTE — ASSESSMENT & PLAN NOTE
· Persistent nitrate responsive chest pain symptoms with history of normal coronary arteries in 2017 raises my suspicion for microvascular angina  · Recommend exercise echo given risk factors  · Start isosorbide mononitrate 30 mg daily  · Consider invasive microvascular testing once available

## 2021-03-01 ENCOUNTER — HOSPITAL ENCOUNTER (OUTPATIENT)
Dept: CARDIOLOGY | Facility: HOSPITAL | Age: 66
End: 2021-03-01

## 2022-02-08 NOTE — PROGRESS NOTES
"[facility]      Identification: Elba Park is a 66 y.o. female who resides in [place], [place]. Her , Romulo, is a patient of mine.    Reason for visit:  · Chest pain with normal coronary arteries      Subjective      Elba Park presents to [facility] for followup.    Elba has had stable chest pain symptoms since her last visit.  They are not exertional in nature.  She is scheduled to undergo upper endoscopy with Trevor Matos in the near future.  She had lab work performed recently as noted below.  Her A1c and LDL are slightly elevated.  Otherwise, acceptable labs.            Review of Systems   Constitutional: Negative for malaise/fatigue.   Eyes: Negative for vision loss in left eye and vision loss in right eye.   Cardiovascular: Positive for chest pain. Negative for dyspnea on exertion, near-syncope, orthopnea, palpitations, paroxysmal nocturnal dyspnea and syncope.   Musculoskeletal: Negative for myalgias.   Neurological: Negative for brief paralysis, excessive daytime sleepiness, focal weakness, numbness, paresthesias and weakness.   All other systems reviewed and are negative.      Objective     /74 (BP Location: Right arm, Patient Position: Sitting)   Pulse 77   Ht 170.2 cm (67\")   Wt 73.5 kg (162 lb)   SpO2 99%   BMI 25.37 kg/m²       Constitutional:       Appearance: Healthy appearance. Well-developed.   Eyes:      General: Lids are normal. No scleral icterus.  HENT:      Head: Normocephalic and atraumatic.   Neck:      Thyroid: No thyroid mass.      Vascular: No carotid bruit or JVD. JVD normal.   Pulmonary:      Effort: Pulmonary effort is normal.      Breath sounds: Normal breath sounds.   Cardiovascular:      Normal rate. Regular rhythm.      Murmurs: There is no murmur.      No gallop.   Musculoskeletal:      Extremities: No clubbing present.Skin:     General: Skin is warm and dry. There is no cyanosis.   Neurological:      General: No " focal deficit present.      Mental Status: Alert.   Psychiatric:         Attention and Perception: Attention normal.         Behavior: Behavior normal. Behavior is cooperative.         Result Review :     Lab date: 1/13/2022  • FLP: , , HDL 42,   • CMP: Glu 119, BUN 11, Creat 0.81, eGFR 76, Na 141, K 4.7, Cl 102, CO2 23, Ca 9.5, Alk Phos 115, AST 10, ALT 7  • CBC: WBC 9.3, RBC 5.23, HGB 14.5, HCT 43.6, MCV 83, MCH 27.7,   • HbA1c: 6.8          Assessment     Problem List Items Addressed This Visit        Cardiology Problems    Essential hypertension (Chronic)    Overview     • Target blood pressure <130/80 mmHg         Hyperlipidemia LDL goal <100 (Chronic)    Overview     · Statin therapy indicated given the presence of diabetes         Current Assessment & Plan     · Based on ACC/AHA guidelines, high intensity statin therapy recommended  · Consider increasing atorvastatin to 40-80 mg daily for primary prevention         Relevant Medications    atorvastatin (LIPITOR) 40 MG tablet    Other Relevant Orders    Comprehensive Metabolic Panel    Lipid Panel       Other    Chest pain with normal coronary angiography - Primary (Chronic)    Overview     · EKG with no ischemic changes, 8/23/17  · Nuclear stress test (09/01/2017): Normal myocardial perfusion study with no evidence of ischemia.  LVEF borderline normal at 49%.  Moderately reduced exercise capacity.  Impression consistent with low risk study.  · Echo (09/01/2017): LVEF  60%. Cardiac valves function normally.  · Cardiac catheterization for refractory symptoms (12/6/17): Normal coronary arteries.  Normal LVEF.         Type 2 diabetes mellitus, without long-term current use of insulin (HCC) (Chronic)    Current Assessment & Plan     · Well-controlled  · Consider SGL 2 inhibitor therapy if needed given favorable cardiovascular effect               Plan   • Increase atorvastatin to 40-80 mg daily  • CMP and lipids in 6 weeks  • No further  cardiology testing needed      Follow-up   Patient like to see me on an as-needed basis        Duncan Bernstein MD, [facility], [facility]  2/9/2022

## 2022-02-09 ENCOUNTER — OFFICE VISIT (OUTPATIENT)
Dept: CARDIOLOGY | Facility: CLINIC | Age: 67
End: 2022-02-09

## 2022-02-09 VITALS
BODY MASS INDEX: 25.43 KG/M2 | HEIGHT: 67 IN | SYSTOLIC BLOOD PRESSURE: 126 MMHG | WEIGHT: 162 LBS | OXYGEN SATURATION: 99 % | DIASTOLIC BLOOD PRESSURE: 74 MMHG | HEART RATE: 77 BPM

## 2022-02-09 DIAGNOSIS — E11.9 TYPE 2 DIABETES MELLITUS WITHOUT COMPLICATION, WITHOUT LONG-TERM CURRENT USE OF INSULIN: ICD-10-CM

## 2022-02-09 DIAGNOSIS — R07.9 CHEST PAIN WITH NORMAL CORONARY ANGIOGRAPHY: Primary | Chronic | ICD-10-CM

## 2022-02-09 DIAGNOSIS — I10 ESSENTIAL HYPERTENSION: Chronic | ICD-10-CM

## 2022-02-09 DIAGNOSIS — E78.5 HYPERLIPIDEMIA LDL GOAL <100: ICD-10-CM

## 2022-02-09 PROCEDURE — 99214 OFFICE O/P EST MOD 30 MIN: CPT | Performed by: INTERNAL MEDICINE

## 2022-02-09 RX ORDER — ATORVASTATIN CALCIUM 40 MG/1
40 TABLET, FILM COATED ORAL NIGHTLY
Qty: 90 TABLET | Refills: 3 | Status: SHIPPED | OUTPATIENT
Start: 2022-02-09

## 2022-02-09 NOTE — ASSESSMENT & PLAN NOTE
· Well-controlled  · Consider SGL 2 inhibitor therapy if needed given favorable cardiovascular effect

## 2022-02-09 NOTE — ASSESSMENT & PLAN NOTE
· Based on ACC/AHA guidelines, high intensity statin therapy recommended  · Consider increasing atorvastatin to 40-80 mg daily for primary prevention

## 2023-09-12 ENCOUNTER — OFFICE VISIT (OUTPATIENT)
Dept: CARDIOLOGY | Facility: CLINIC | Age: 68
End: 2023-09-12
Payer: MEDICARE

## 2023-09-12 VITALS
SYSTOLIC BLOOD PRESSURE: 130 MMHG | HEIGHT: 67 IN | HEART RATE: 80 BPM | OXYGEN SATURATION: 98 % | BODY MASS INDEX: 25.58 KG/M2 | WEIGHT: 163 LBS | DIASTOLIC BLOOD PRESSURE: 74 MMHG

## 2023-09-12 DIAGNOSIS — G47.33 OBSTRUCTIVE SLEEP APNEA: Primary | ICD-10-CM

## 2023-09-12 DIAGNOSIS — I10 ESSENTIAL HYPERTENSION: Chronic | ICD-10-CM

## 2023-09-12 DIAGNOSIS — G47.00 INSOMNIA, UNSPECIFIED TYPE: ICD-10-CM

## 2023-09-12 RX ORDER — FAMOTIDINE 20 MG/1
20 TABLET, FILM COATED ORAL
COMMUNITY
Start: 2023-08-15

## 2023-09-12 RX ORDER — PREDNISOLONE ACETATE 10 MG/ML
SUSPENSION/ DROPS OPHTHALMIC
COMMUNITY
Start: 2023-08-16

## 2023-09-12 NOTE — PROGRESS NOTES
Follow-Up Sleep Consult     Date:   2023  Name: Elba Park  :   1955  PCP: Sulema Woods APRN    Chief Complaint   Patient presents with    Sleep Apnea       Subjective     History of Present Illness  Elba Park is a 67 y.o. female who presents today for follow-up on GABRIELLA.This is her scheduled 1 year follow up. She has a 6 year old PAP that she considered replacing her greater than 5-year-old PAP last year, but she has portion to pay and wanted to put it off.     Sleep History:     GABRIELLA AHI 22 on 2010    Titration 2017     Current CPAP therapy: CPAP 7-11cm       Current mask used is FFM     Device Functioning Well: No, Device is greater than 5 years old  and Older Device   Mask Fit Comfortable: Yes  Air Flow Comfortable: Yes  DME Helpful for Supplies: Yes  Sleep is rested: No, She has been waking up about 3AM. Then she cannot fall back asleep. She tries to relax for more than 1 hour and then she will get up and start her day. She will normally not nap except once a week and she will doze during evening news.  She is having a hard time falling asleep and staying asleep. She can't turn her mind off and worries. In the past she has taken Ambien and it was helpful, but she reports she was weaned off of this several years ago.        Device Download:           The patient's relevant past medical, surgical, family, and social history reviewed and updated in Epic as appropriate.    Past Medical History:   Diagnosis Date    Arthritis     knees, left ankle     Cancer     skin -left leg     Chronic insomnia     Chronic venous insufficiency     Constipation     meds prn     Dental bridge present     Diabetes mellitus type 2, controlled, without complications     checks sugars once or twice weekly-usually run in 140s    Disease of thyroid gland     thryoid lesions-monitored annually     Esophageal reflux     Essential hypertension     GERD (gastroesophageal reflux disease)      Lower esophageal ring (Schatzki) 10/2017    recent dilation     Mixed hyperlipidemia     Obstructive sleep apnea     Osteoporosis     Skin lesion     Sleep apnea with use of continuous positive airway pressure (CPAP)     SARAH (stress urinary incontinence, female)      Past Surgical History:   Procedure Laterality Date    CARDIAC CATHETERIZATION  2007    normal ([facility])    CARDIAC CATHETERIZATION N/A 12/06/2017    Procedure: Left Heart Cath;  Surgeon: Son Bernstein IV, MD;  Location: [facility] CATH INVASIVE LOCATION;  Service:     CATARACT EXTRACTION Bilateral     COLONOSCOPY  2017    ENDOMETRIAL BIOPSY  2008    [facility]    ENDOSCOPY      recent scope included dilation     INCISIONAL HERNIA REPAIR  2010    LASIK      NASAL SEPTUM SURGERY      SKIN CANCER DESTRUCTION      left leg     UMBILICAL HERNIA REPAIR      total of 6 overall hernia surgeries     VENTRAL HERNIA REPAIR       OB History    No obstetric history on file.       Allergies   Allergen Reactions    Adhesive Tape Rash     Liquid adhesive      Prior to Admission medications    Medication Sig Start Date End Date Taking? Authorizing Provider   atorvastatin (LIPITOR) 40 MG tablet Take 1 tablet by mouth Every Night. 2/9/22  Yes Son Bernstein IV, MD   Cyanocobalamin (VITAMIN B-12 PO) Take 1 tablet by mouth Daily.   Yes ProviderNydia MD   famotidine (PEPCID) 20 MG tablet Take 1 tablet by mouth. 8/15/23  Yes Nydia Ayala MD   isosorbide mononitrate (IMDUR) 30 MG 24 hr tablet Take 1 tablet by mouth Every Morning. 2/5/21  Yes Son Bernstein IV, MD   lisinopril (PRINIVIL,ZESTRIL) 10 MG tablet Take 1 tablet by mouth Daily. 2/5/21  Yes Son Bernstein IV, MD   loratadine (CLARITIN) 10 MG tablet Take 1 tablet by mouth Daily.  Patient taking differently: Take 1 tablet by mouth As Needed for Allergies. 8/14/17  Yes Sacha Sanchez MD   metFORMIN (GLUCOPHAGE) 1000 MG tablet Take 1 tablet by mouth 2 (Two)  "Times a Day With Meals. 2/5/21  Yes Son Bernstein IV, MD   nitroglycerin (Nitrostat) 0.4 MG SL tablet Place 1 tablet under the tongue Every 5 (Five) Minutes As Needed for Chest Pain. Take no more than 3 doses in 15 minutes. 2/5/21  Yes Son Bernstein IV, MD   pantoprazole (PROTONIX) 40 MG EC tablet Take 1 tablet by mouth Daily.   Yes Provider, MD Nydia   prednisoLONE acetate (PRED FORTE) 1 % ophthalmic suspension INSTILL 1 DROP INTO EACH EYE 4 TIMES DAILY 8/16/23  Yes Provider, MD Nydia   zolpidem (AMBIEN) 10 MG tablet Take 1 tablet by mouth At Night As Needed for Sleep. 8/14/17  Yes Sacha Sanchez MD     Family History   Problem Relation Age of Onset    Diabetes Mother     Heart disease Mother     Coronary artery disease Father     Heart disease Father     Breast cancer Sister        Objective     Vital Signs:  /74 (BP Location: Left arm, Patient Position: Sitting)   Pulse 80   Ht 170.2 cm (67\")   Wt 73.9 kg (163 lb)   SpO2 98%   BMI 25.53 kg/m²     BMI is >= 25 and <30. (Overweight) The following options were offered after discussion;: referral to primary care        Physical Exam  HENT:      Head: Normocephalic.      Nose: Nose normal.      Mouth/Throat:      Mouth: Mucous membranes are moist.   Pulmonary:      Effort: Pulmonary effort is normal.   Skin:     General: Skin is warm and dry.   Neurological:      Mental Status: She is alert and oriented to person, place, and time.   Psychiatric:         Mood and Affect: Mood normal.         Behavior: Behavior normal.         Thought Content: Thought content normal.           PAP download reviewed: 8/13 through 9/11/2023.  30-day download.  I have reviewed and interpreted the data on the download.         Assessment and Plan     Diagnoses and all orders for this visit:    1. Obstructive sleep apnea (Primary)  Assessment & Plan:  Known history of moderate sleep apnea.  She is on CPAP therapy.    Noted that her PAP " device is greater than 5 years old.    Download is reviewed with good control and good compliance.  She is benefiting from PAP therapy with plan to continue PAP therapy.    Prescription for new CPAP including the pressures 7 to 11 cm to replace her greater than 5-year-old PAP and supply order to the DME of her choice.    Plan follow-up in about 2 months for a 31 to 90-day compliance download visit.    Orders:  -     PAP Therapy    2. Insomnia, unspecified type  Assessment & Plan:  She has had a long history of insomnia.  She has been on Ambien in the past.  After her long-term family physician retired she was weaned off of this medication.    She reports a hard time falling asleep and hard time staying asleep.  She reports that her mind is hard to shut off and that she will worry.    She reports that she will feel tired in the daytime but she is not normally sleepy in the daytime.  She reports she does not take a nap until much later in the day after the 4 to 5 PM.  She reports she will doze when watching TV.    We discussed good sleep hygiene measures.  We also discussed considering a referral for cognitive behavioral therapy but at this time she has declined.      3. Essential hypertension  Assessment & Plan:  Blood pressure today 130/74.  This is well controlled.  She is on isosorbide and lisinopril.  She is encouraged to continue her current medication regimen.  She is encouraged to continue following up with her prescribing provider.  She is also encouraged to continue her CPAP therapy as untreated sleep apnea may potentiate hypertension.    We plan to replace her greater than 5-year-old CPAP.          Report if any new/changing symptoms immediately and Sleep hygiene discussed         Follow Up  Return in about 3 months (around 12/12/2023) for replaced CPAP , GABRIELLA.  Patient was given instructions and counseling regarding her condition or for health maintenance advice. Please see specific information pulled into  the AVS if appropriate.

## 2023-09-12 NOTE — PATIENT INSTRUCTIONS
Quality Sleep Information, Adult  Quality sleep is important for your mental and physical health. It also improves your quality of life. Quality sleep means you:  Are asleep for most of the time you are in bed.  Fall asleep within 30 minutes.  Wake up no more than once a night.   Are awake for no longer than 20 minutes if you do wake up during the night.  Most adults need 7-8 hours of quality sleep each night.  How can poor sleep affect me?  If you do not get enough quality sleep, you may have:  Mood swings.  Daytime sleepiness.  Confusion.  Decreased reaction time.  Sleep disorders, such as insomnia and sleep apnea.  Difficulty with:  Solving problems.  Coping with stress.  Paying attention.  These issues may affect your performance and productivity at work, school, and at home. Lack of sleep may also put you at higher risk for accidents, suicide, and risky behaviors.  If you do not get quality sleep you may also be at higher risk for several health problems, including:  Infections.  Type 2 diabetes.  Heart disease.  High blood pressure.  Obesity.  Worsening of long-term conditions, like arthritis, kidney disease, depression, Parkinson's disease, and epilepsy.  What actions can I take to get more quality sleep?  A sign showing that a person should not smoke.         A sign showing that a person should not drink alcohol.      Stick to a sleep schedule. Go to sleep and wake up at about the same time each day. Do not try to sleep less on weekdays and make up for lost sleep on weekends. This does not work.  Try to get about 30 minutes of exercise on most days. Do not exercise 2-3 hours before going to bed.  Limit naps during the day to 30 minutes or less.  Do not use any products that contain nicotine or tobacco, such as cigarettes or e-cigarettes. If you need help quitting, ask your health care provider.  Do not drink caffeinated beverages for at least 8 hours before going to bed. Coffee, tea, and some sodas contain  caffeine.  Do not drink alcohol close to bedtime.  Do not eat large meals close to bedtime.  Do not take naps in the late afternoon.  Try to get at least 30 minutes of sunlight every day. Morning sunlight is best.  Make time to relax before bed. Reading, listening to music, or taking a hot bath promotes quality sleep.  Make your bedroom a place that promotes quality sleep. Keep your bedroom dark, quiet, and at a comfortable room temperature. Make sure your bed is comfortable. Take out sleep distractions like TV, a computer, smartphone, and bright lights.  If you are lying awake in bed for longer than 20 minutes, get up and do a relaxing activity until you feel sleepy.  Work with your health care provider to treat medical conditions that may affect sleeping, such as:  Nasal obstruction.  Snoring.  Sleep apnea and other sleep disorders.  Talk to your health care provider if you think any of your prescription medicines may cause you to have difficulty falling or staying asleep.  If you have sleep problems, talk with a sleep consultant. If you think you have a sleep disorder, talk with your health care provider about getting evaluated by a specialist.  Where to find more information  National Sleep Foundation website: https://sleepfoundation.org  National Heart, Lung, and Blood [place] (NHLBI): www.nhlbi.nih.gov/files/docs/public/sleep/healthy_sleep.pdf  Centers for Disease Control and Prevention (CDC): www.cdc.gov/sleep/index.html  Contact a health care provider if you:  Have trouble getting to sleep or staying asleep.  Often wake up very early in the morning and cannot get back to sleep.  Have daytime sleepiness.  Have daytime sleep attacks of suddenly falling asleep and sudden muscle weakness (narcolepsy).  Have a tingling sensation in your legs with a strong urge to move your legs (restless legs syndrome).  Stop breathing briefly during sleep (sleep apnea).  Think you have a sleep disorder or are taking a medicine  that is affecting your quality of sleep.  Summary  Most adults need 7-8 hours of quality sleep each night.  Getting enough quality sleep is an important part of health and well-being.  Make your bedroom a place that promotes quality sleep and avoid things that may cause you to have poor sleep, such as alcohol, caffeine, smoking, and large meals.  Talk to your health care provider if you have trouble falling asleep or staying asleep.  This information is not intended to replace advice given to you by your health care provider. Make sure you discuss any questions you have with your health care provider.  Document Revised: 10/17/2022 Document Reviewed: 10/17/2022  Elsevier Patient Education © 2022 [employer] Inc.

## 2023-09-13 NOTE — ASSESSMENT & PLAN NOTE
Known history of moderate sleep apnea.  She is on CPAP therapy.    Noted that her PAP device is greater than 5 years old.    Download is reviewed with good control and good compliance.  She is benefiting from PAP therapy with plan to continue PAP therapy.    Prescription for new CPAP including the pressures 7 to 11 cm to replace her greater than 5-year-old PAP and supply order to the DME of her choice.    Plan follow-up in about 2 months for a 31 to 90-day compliance download visit.

## 2023-09-13 NOTE — ASSESSMENT & PLAN NOTE
She has had a long history of insomnia.  She has been on Ambien in the past.  After her long-term family physician retired she was weaned off of this medication.    She reports a hard time falling asleep and hard time staying asleep.  She reports that her mind is hard to shut off and that she will worry.    She reports that she will feel tired in the daytime but she is not normally sleepy in the daytime.  She reports she does not take a nap until much later in the day after the 4 to 5 PM.  She reports she will doze when watching TV.    We discussed good sleep hygiene measures.  We also discussed considering a referral for cognitive behavioral therapy but at this time she has declined.

## 2023-09-13 NOTE — ASSESSMENT & PLAN NOTE
Blood pressure today 130/74.  This is well controlled.  She is on isosorbide and lisinopril.  She is encouraged to continue her current medication regimen.  She is encouraged to continue following up with her prescribing provider.  She is also encouraged to continue her CPAP therapy as untreated sleep apnea may potentiate hypertension.    We plan to replace her greater than 5-year-old CPAP.

## 2023-10-09 ENCOUNTER — TELEPHONE (OUTPATIENT)
Dept: CARDIOLOGY | Facility: CLINIC | Age: 68
End: 2023-10-09
Payer: MEDICARE

## 2023-10-09 DIAGNOSIS — G47.33 OBSTRUCTIVE SLEEP APNEA: Primary | ICD-10-CM

## 2023-10-09 NOTE — TELEPHONE ENCOUNTER
Patient called in needs rx for heated tubing sent to [facility] we sent order for regular tubing thanks

## 2023-12-12 ENCOUNTER — OFFICE VISIT (OUTPATIENT)
Dept: CARDIOLOGY | Facility: CLINIC | Age: 68
End: 2023-12-12
Payer: MEDICARE

## 2023-12-12 VITALS
SYSTOLIC BLOOD PRESSURE: 126 MMHG | WEIGHT: 162 LBS | OXYGEN SATURATION: 97 % | DIASTOLIC BLOOD PRESSURE: 76 MMHG | BODY MASS INDEX: 25.43 KG/M2 | HEART RATE: 86 BPM | HEIGHT: 67 IN

## 2023-12-12 DIAGNOSIS — G47.00 INSOMNIA, UNSPECIFIED TYPE: ICD-10-CM

## 2023-12-12 DIAGNOSIS — I10 ESSENTIAL HYPERTENSION: Chronic | ICD-10-CM

## 2023-12-12 DIAGNOSIS — G47.33 OBSTRUCTIVE SLEEP APNEA: Primary | ICD-10-CM

## 2023-12-12 NOTE — PROGRESS NOTES
Follow-Up Sleep Consult     Date:   2023  Name: Elba Park  :   1955  PCP: Shanon Rangel MD    Chief Complaint   Patient presents with    Sleep Apnea       Subjective     History of Present Illness  Elba Park is a 68 y.o. female who presents today for follow-up on GABRIELLA.    Patient states she is doing well she likes her new machine.  Airflow is comfortable, and mask fit is comfortable.  Sleep History:     GABRIELLA AHI 22 on 2010    Titration 2017     Current CPAP therapy: CPAP 7-11cm     Current mask used is FFM    Device Functioning Well: Yes  Mask Fit Comfortable: Yes  Air Flow Comfortable: Yes  DME Helpful for Supplies: Yes  Sleep is rested: No, she continues to wake up around 3 AM and is unable to go back to sleep.  She does get up and then lays back down for a few hours.  This is up been ongoing for several years.  She has been on Ambien in the past but was weaned off of it.  She does take an evening nap.        Device Download:                         The patient's relevant past medical, surgical, family, and social history reviewed and updated in Epic as appropriate.    Past Medical History:   Diagnosis Date    Arthritis     knees, left ankle     Cancer     skin -left leg     Chronic insomnia     Chronic venous insufficiency     Constipation     meds prn     Dental bridge present     Diabetes mellitus type 2, controlled, without complications     checks sugars once or twice weekly-usually run in 140s    Disease of thyroid gland     thryoid lesions-monitored annually     Esophageal reflux     Essential hypertension     GERD (gastroesophageal reflux disease)     Lower esophageal ring ([place]) 10/2017    recent dilation     Mixed hyperlipidemia     Obstructive sleep apnea     Osteoporosis     Skin lesion     Sleep apnea with use of continuous positive airway pressure (CPAP)     SARAH (stress urinary incontinence, female)      Past Surgical History:   Procedure  Laterality Date    CARDIAC CATHETERIZATION  2007    normal ([facility])    CARDIAC CATHETERIZATION N/A 12/06/2017    Procedure: Left Heart Cath;  Surgeon: Son Bernstein IV, MD;  Location: [facility] CATH INVASIVE LOC[facility];  Service:     CATARACT EXTRACTION Bilateral     COLONOSCOPY  2017    ENDOMETRIAL BIOPSY  2008    [facility]    ENDOSCOPY      recent scope included dilation     INCISIONAL HERNIA REPAIR  2010    LASIK      NASAL SEPTUM SURGERY      SKIN CANCER DESTRUCTION      left leg     UMBILICAL HERNIA REPAIR      total of 6 overall hernia surgeries     VENTRAL HERNIA REPAIR       OB History    No obstetric history on file.       Allergies   Allergen Reactions    Adhesive Tape Rash     Liquid adhesive      Prior to Admission medications    Medication Sig Start Date End Date Taking? Authorizing Provider   atorvastatin (LIPITOR) 40 MG tablet Take 1 tablet by mouth Every Night. 2/9/22  Yes Son Bernstein IV, MD   Cyanocobalamin (VITAMIN B-12 PO) Take 1 tablet by mouth Daily.   Yes ProviderNydia MD   famotidine (PEPCID) 20 MG tablet Take 1 tablet by mouth. 8/15/23  Yes ProviderNydia MD   isosorbide mononitrate (IMDUR) 30 MG 24 hr tablet Take 1 tablet by mouth Every Morning. 2/5/21  Yes Son Bernstein IV, MD   lisinopril (PRINIVIL,ZESTRIL) 10 MG tablet Take 1 tablet by mouth Daily. 2/5/21  Yes Son Bernstein IV, MD   loratadine (CLARITIN) 10 MG tablet Take 1 tablet by mouth Daily.  Patient taking differently: Take 1 tablet by mouth As Needed for Allergies. 8/14/17  Yes Sacha Sanchez MD   metFORMIN (GLUCOPHAGE) 1000 MG tablet Take 1 tablet by mouth 2 (Two) Times a Day With Meals. 2/5/21  Yes Son Bernstein IV, MD   nitroglycerin (Nitrostat) 0.4 MG SL tablet Place 1 tablet under the tongue Every 5 (Five) Minutes As Needed for Chest Pain. Take no more than 3 doses in 15 minutes. 2/5/21  Yes Son Bernstein IV, MD   pantoprazole (PROTONIX) 40  "MG EC tablet Take 1 tablet by mouth Daily.   Yes Provider, MD Nydia   prednisoLONE acetate (PRED FORTE) 1 % ophthalmic suspension INSTILL 1 DROP INTO EACH EYE 4 TIMES DAILY 8/16/23  Yes Provider, MD Nydia     Family History   Problem Relation Age of Onset    Diabetes Mother     Heart disease Mother     Coronary artery disease Father     Heart disease Father     Breast cancer Sister        Objective     Vital Signs:  /76   Pulse 86   Ht 170.2 cm (67\")   Wt 73.5 kg (162 lb)   SpO2 97%   BMI 25.37 kg/m²              Physical Exam    The following data was reviewed by: TREVOR Menard on 12/12/2023:  30-day download from 11/11/2023 to 12/10/2023 data from download has been reviewed and interpreted.  Patient is here for her insurance compliance visit from her new machine.  She shows good compliance and control AHI 0.2.  Airflow is comfortable.  Patient is receiving benefit from PAP therapy.  Plan to continue current treatment.         Assessment and Plan     Diagnoses and all orders for this visit:    1. Obstructive sleep apnea (Primary)  Assessment & Plan:  Patient has a baseline AHI 22.  Download from new machine shows good compliance and control AHI 0.2.  Airflow is comfortable.  She is receiving benefit from PAP therapy.  Plan to continue current treatment.      2. Insomnia, unspecified type  Assessment & Plan:  She has had a long history of insomnia.  She has been on Ambien in the past.  After her long-term family physician retired she was weaned off of this medication.     She reports a hard time falling asleep and hard time staying asleep.  She reports that her mind is hard to shut off and that she will worry.     She reports that she will feel tired in the daytime but she is not normally sleepy in the daytime.  She reports she does not take a nap until much later in the day after the 4 to 5 PM.  She reports she will doze when watching TV.      3. Essential hypertension  Assessment & " Plan:  Blood pressure today 126/76.  This is well controlled.  She is on isosorbide and lisinopril.  She is encouraged to continue her current medication regimen.  She is encouraged to continue following up with her prescribing provider.  She is also encouraged to continue her CPAP therapy as untreated sleep apnea may potentiate hypertension.             Report if any new/changing symptoms immediately, Sleep risks reviewed (driving, medical, sleep death, sedating agents), and Sleep hygiene discussed         Follow Up  Return in about 9 months (around 9/12/2024) for Next scheduled follow up.  Patient was given instructions and counseling regarding her condition or for health maintenance advice. Please see specific information pulled into the AVS if appropriate.

## 2023-12-12 NOTE — ASSESSMENT & PLAN NOTE
She has had a long history of insomnia.  She has been on Ambien in the past.  After her long-term family physician retired she was weaned off of this medication.     She reports a hard time falling asleep and hard time staying asleep.  She reports that her mind is hard to shut off and that she will worry.     She reports that she will feel tired in the daytime but she is not normally sleepy in the daytime.  She reports she does not take a nap until much later in the day after the 4 to 5 PM.  She reports she will doze when watching TV.

## 2023-12-12 NOTE — ASSESSMENT & PLAN NOTE
Blood pressure today 126/76.  This is well controlled.  She is on isosorbide and lisinopril.  She is encouraged to continue her current medication regimen.  She is encouraged to continue following up with her prescribing provider.  She is also encouraged to continue her CPAP therapy as untreated sleep apnea may potentiate hypertension.

## 2023-12-12 NOTE — ASSESSMENT & PLAN NOTE
Patient has a baseline AHI 22.  Download from new machine shows good compliance and control AHI 0.2.  Airflow is comfortable.  She is receiving benefit from PAP therapy.  Plan to continue current treatment.

## 2024-09-09 ENCOUNTER — OFFICE VISIT (OUTPATIENT)
Dept: CARDIOLOGY | Facility: CLINIC | Age: 69
End: 2024-09-09
Payer: MEDICARE

## 2024-09-09 VITALS
SYSTOLIC BLOOD PRESSURE: 136 MMHG | DIASTOLIC BLOOD PRESSURE: 82 MMHG | HEIGHT: 67 IN | OXYGEN SATURATION: 96 % | WEIGHT: 160 LBS | BODY MASS INDEX: 25.11 KG/M2 | HEART RATE: 79 BPM

## 2024-09-09 DIAGNOSIS — G47.33 OBSTRUCTIVE SLEEP APNEA: Primary | ICD-10-CM

## 2024-09-09 DIAGNOSIS — G47.00 INSOMNIA, UNSPECIFIED TYPE: ICD-10-CM

## 2024-09-09 PROCEDURE — 1159F MED LIST DOCD IN RCRD: CPT | Performed by: NURSE PRACTITIONER

## 2024-09-09 PROCEDURE — 3075F SYST BP GE 130 - 139MM HG: CPT | Performed by: NURSE PRACTITIONER

## 2024-09-09 PROCEDURE — 1160F RVW MEDS BY RX/DR IN RCRD: CPT | Performed by: NURSE PRACTITIONER

## 2024-09-09 PROCEDURE — 3079F DIAST BP 80-89 MM HG: CPT | Performed by: NURSE PRACTITIONER

## 2024-09-09 PROCEDURE — 99213 OFFICE O/P EST LOW 20 MIN: CPT | Performed by: NURSE PRACTITIONER

## 2024-09-09 NOTE — ASSESSMENT & PLAN NOTE
Patient states that she is sleeping better.  She states that she wakes up about 3:30 in the morning and sometimes cannot go back to sleep.  She states overall her sleep is much improved.

## 2024-09-09 NOTE — ASSESSMENT & PLAN NOTE
Patient has a baseline AHI 22.  This is moderate sleep apnea.  Download shows good compliance and control.  Mask fit and airflow are comfortable.  Patient is receiving benefit from PAP therapy.  Plan to continue current treatment.    Supply order sent to DME of patient choice.    Plan follow-up in 1 year or sooner if any PAP issues or concerns.

## 2024-09-09 NOTE — PROGRESS NOTES
Follow-Up Sleep Consult     Date:   2024  Name: Elba Park  :   1955  PCP: Shanon Rangel MD    Chief Complaint   Patient presents with    Sleep Apnea       Subjective     History of Present Illness  Elba Park is a 68 y.o. female who presents today for follow-up on GABRIELLA.    Patient states she is doing very well on her PAP therapy.  She states that her PAP machine broke and she was on a loaner machine for a few weeks.  She now has her machine back and it is functioning well.    Sleep History:     GABRIELLA AHI 22 on 2010    Titration 2017     Current CPAP therapy: CPAP 7-11cm     Follows with Dr. Bernstein cardiology    Current mask used is FFM    Device Functioning Well: Yes  Mask Fit Comfortable: Yes  Air Flow Comfortable: Yes  DME Helpful for Supplies: Yes  Sleep is rested: No, Something wakes her up around 3:30 am and it is hard for her to go back to sleep.        Device Download:                         The patient's relevant past medical, surgical, family, and social history reviewed and updated in Epic as appropriate.    Past Medical History:   Diagnosis Date    Arthritis     knees, left ankle     Cancer     skin -left leg     Chronic insomnia     Chronic venous insufficiency     Constipation     meds prn     Dental bridge present     Diabetes mellitus type 2, controlled, without complications     checks sugars once or twice weekly-usually run in 140s    Disease of thyroid gland     thryoid lesions-monitored annually     Esophageal reflux     Essential hypertension     GERD (gastroesophageal reflux disease)     Lower esophageal ring ([place]) 10/2017    recent dilation     Mixed hyperlipidemia     Obstructive sleep apnea     Osteoporosis     Skin lesion     Sleep apnea with use of continuous positive airway pressure (CPAP)     SARAH (stress urinary incontinence, female)      Past Surgical History:   Procedure Laterality Date    CARDIAC CATHETERIZATION      normal  ([facility])    CARDIAC CATHETERIZATION N/A 12/06/2017    Procedure: Left Heart Cath;  Surgeon: Son Bernstein IV, MD;  Location: [facility] CATH INVASIVE LOCATION;  Service:     CATARACT EXTRACTION Bilateral     COLONOSCOPY  2017    ENDOMETRIAL BIOPSY  2008    [facility]    ENDOSCOPY      recent scope included dilation     INCISIONAL HERNIA REPAIR  2010    LASIK      NASAL SEPTUM SURGERY      SKIN CANCER DESTRUCTION      left leg     UMBILICAL HERNIA REPAIR      total of 6 overall hernia surgeries     VENTRAL HERNIA REPAIR       OB History    No obstetric history on file.       Allergies   Allergen Reactions    Adhesive Tape Rash     Liquid adhesive      Prior to Admission medications    Medication Sig Start Date End Date Taking? Authorizing Provider   atorvastatin (LIPITOR) 40 MG tablet Take 1 tablet by mouth Every Night. 2/9/22  Yes Son Bernstein IV, MD   Cyanocobalamin (VITAMIN B-12 PO) Take 1 tablet by mouth Daily.   Yes ProviderNydia MD   famotidine (PEPCID) 20 MG tablet Take 1 tablet by mouth. 8/15/23  Yes Nydia Aayla MD   isosorbide mononitrate (IMDUR) 30 MG 24 hr tablet Take 1 tablet by mouth Every Morning. 2/5/21  Yes Son Bernstein IV, MD   lisinopril (PRINIVIL,ZESTRIL) 10 MG tablet Take 1 tablet by mouth Daily. 2/5/21  Yes Son Benrstein IV, MD   loratadine (CLARITIN) 10 MG tablet Take 1 tablet by mouth Daily.  Patient taking differently: Take 1 tablet by mouth As Needed for Allergies. 8/14/17  Yes Sacha Sanchez MD   metFORMIN (GLUCOPHAGE) 1000 MG tablet Take 1 tablet by mouth 2 (Two) Times a Day With Meals. 2/5/21  Yes Son Bernstein IV, MD   nitroglycerin (Nitrostat) 0.4 MG SL tablet Place 1 tablet under the tongue Every 5 (Five) Minutes As Needed for Chest Pain. Take no more than 3 doses in 15 minutes. 2/5/21  Yes Sno Bernstein IV, MD   pantoprazole (PROTONIX) 40 MG EC tablet Take 1 tablet by mouth Daily.   Yes Lucy  "MD Nydia   prednisoLONE acetate (PRED FORTE) 1 % ophthalmic suspension INSTILL 1 DROP INTO EACH EYE 4 TIMES DAILY 8/16/23  Yes Provider, MD Nydia     Family History   Problem Relation Age of Onset    Diabetes Mother     Heart disease Mother     Coronary artery disease Father     Heart disease Father     Breast cancer Sister        Objective     Vital Signs:  /82   Pulse 79   Ht 170.2 cm (67\")   Wt 72.6 kg (160 lb)   SpO2 96%   BMI 25.06 kg/m²              Physical Exam  Constitutional:       Appearance: Normal appearance.   Neurological:      General: No focal deficit present.      Mental Status: She is alert and oriented to person, place, and time.   Psychiatric:         Mood and Affect: Mood normal.         Behavior: Behavior normal.         Thought Content: Thought content normal.         Judgment: Judgment normal.         The following data was reviewed by: TREVOR Menard on 09/09/2024:    30-day download 8/5/2024 to 9/3/2024 I have reviewed interpret the data from the download.  Download shows good compliance and control.  Has been airflow are comfortable.  Patient is receiving benefit from PAP therapy.  Plan to continue current treatment.         Assessment and Plan     Diagnoses and all orders for this visit:    1. Obstructive sleep apnea (Primary)  Assessment & Plan:  Patient has a baseline AHI 22.  This is moderate sleep apnea.  Download shows good compliance and control.  Mask fit and airflow are comfortable.  Patient is receiving benefit from PAP therapy.  Plan to continue current treatment.    Supply order sent to DME of patient choice.    Plan follow-up in 1 year or sooner if any PAP issues or concerns.    Orders:  -     PAP Therapy    2. Insomnia, unspecified type  Assessment & Plan:  Patient states that she is sleeping better.  She states that she wakes up about 3:30 in the morning and sometimes cannot go back to sleep.  She states overall her sleep is much " improved.          Report if any new/changing symptoms immediately, Sleep risks reviewed (driving, medical, sleep death, sedating agents), and Sleep hygiene discussed         Follow Up  Return in about 1 year (around 9/9/2025) for GABRIELLA.  Patient was given instructions and counseling regarding her condition or for health maintenance advice. Please see specific information pulled into the AVS if appropriate.

## (undated) DEVICE — CATH DIAG EXPO M/ PK 5F FL4/FR4 PIG

## (undated) DEVICE — PK CATH CARD 10

## (undated) DEVICE — DEV COMP RAD PRELUDESYNC 24CM

## (undated) DEVICE — GLIDESHEATH BASIC HYDROPHILIC COATED INTRODUCER SHEATH: Brand: GLIDESHEATH

## (undated) DEVICE — Device

## (undated) DEVICE — MODEL BT2000 P/N 700287-012KIT CONTENTS: MANIFOLD WITH SALINE AND CONTRAST PORTS, SALINE TUBING WITH SPIKE AND HAND SYRINGE, TRANSDUCER: Brand: BT2000 AUTOMATED MANIFOLD KIT

## (undated) DEVICE — CATH DIAG EXPO .045 FL3  5F 100CM

## (undated) DEVICE — MODEL AT P65, P/N 701554-001KIT CONTENTS: HAND CONTROLLER, 3-WAY HIGH-PRESSURE STOPCOCK WITH ROTATING END AND PREMIUM HIGH-PRESSURE TUBING: Brand: ANGIOTOUCH® KIT

## (undated) DEVICE — A2000 MULTI-USE SYRINGE KIT, P/N 701277-003KIT CONTENTS: 100ML CONTRAST RESERVOIR AND TUBING WITH CONTRAST SPIKE AND CLAMP: Brand: A2000 MULTI-USE SYRINGE KIT